# Patient Record
Sex: MALE | Race: WHITE | NOT HISPANIC OR LATINO | Employment: OTHER | URBAN - METROPOLITAN AREA
[De-identification: names, ages, dates, MRNs, and addresses within clinical notes are randomized per-mention and may not be internally consistent; named-entity substitution may affect disease eponyms.]

---

## 2017-03-07 ENCOUNTER — OFFICE VISIT (OUTPATIENT)
Dept: DERMATOLOGY | Facility: CLINIC | Age: 67
End: 2017-03-07
Payer: MEDICARE

## 2017-03-07 VITALS — WEIGHT: 204.81 LBS | HEIGHT: 72 IN | BODY MASS INDEX: 27.74 KG/M2

## 2017-03-07 DIAGNOSIS — D48.5 NEOPLASM OF UNCERTAIN BEHAVIOR OF SKIN: Primary | ICD-10-CM

## 2017-03-07 DIAGNOSIS — L57.0 MULTIPLE ACTINIC KERATOSES: ICD-10-CM

## 2017-03-07 DIAGNOSIS — Z85.828 PERSONAL HISTORY OF OTHER MALIGNANT NEOPLASM OF SKIN: ICD-10-CM

## 2017-03-07 PROCEDURE — 99999 PR PBB SHADOW E&M-EST. PATIENT-LVL II: CPT | Mod: PBBFAC,,, | Performed by: DERMATOLOGY

## 2017-03-07 PROCEDURE — 99212 OFFICE O/P EST SF 10 MIN: CPT | Mod: PBBFAC,PO | Performed by: DERMATOLOGY

## 2017-03-07 PROCEDURE — 99213 OFFICE O/P EST LOW 20 MIN: CPT | Mod: 25,S$PBB,, | Performed by: DERMATOLOGY

## 2017-03-07 PROCEDURE — 11100 PR BIOPSY OF SKIN LESION: CPT | Mod: S$PBB,,, | Performed by: DERMATOLOGY

## 2017-03-07 PROCEDURE — 88305 TISSUE EXAM BY PATHOLOGIST: CPT | Performed by: PATHOLOGY

## 2017-03-07 PROCEDURE — 11100 PR BIOPSY OF SKIN LESION: CPT | Mod: PBBFAC,PO | Performed by: DERMATOLOGY

## 2017-03-07 RX ORDER — FLUOROURACIL 50 MG/G
CREAM TOPICAL
Qty: 40 G | Refills: 1 | Status: SHIPPED | OUTPATIENT
Start: 2017-03-07 | End: 2020-06-03 | Stop reason: SDUPTHER

## 2017-03-07 NOTE — PROGRESS NOTES
Subjective:       Patient ID:  Rob Hickman is a 67 y.o. male who presents for   Chief Complaint   Patient presents with    Follow-up     HPI Comments:   High risk patient here for skin/ mole check     C/o a  new raised flesh colored spot on left side of face for 1 year with no itching and no treatment.        Phc BCC L arm approx 2010- Dr Gomez excised   Pt admitted to high sun exposure & sunburns in youth   Father had skin ca- type unknown           history numerous aks s/p cryo at last visit 7/2016  Growth on right chest, years, asx, no tx.     Review of Systems   Skin: Positive for activity-related sunscreen use and wears hat.        Objective:    Physical Exam   Constitutional: He appears well-developed and well-nourished. No distress.   HENT:   Head:       Mouth/Throat: Lips normal.    Eyes: Lids are normal.  No conjunctival no injection.   Neurological: He is alert and oriented to person, place, and time. He is not disoriented.   Psychiatric: He has a normal mood and affect.   Skin:   Areas Examined (abnormalities noted in diagram):   Head / Face Inspection Performed  Neck Inspection Performed  Chest / Axilla Inspection Performed  Back Inspection Performed  RUE Inspected  LUE Inspection Performed                   Diagram Legend     Erythematous scaling macule/papule c/w actinic keratosis       Vascular papule c/w angioma      Pigmented verrucoid papule/plaque c/w seborrheic keratosis      Yellow umbilicated papule c/w sebaceous hyperplasia      Irregularly shaped tan macule c/w lentigo     1-2 mm smooth white papules consistent with Milia      Movable subcutaneous cyst with punctum c/w epidermal inclusion cyst      Subcutaneous movable cyst c/w pilar cyst      Firm pink to brown papule c/w dermatofibroma      Pedunculated fleshy papule(s) c/w skin tag(s)      Evenly pigmented macule c/w junctional nevus     Mildly variegated pigmented, slightly irregular-bordered macule c/w mildly atypical nevus       Flesh colored to evenly pigmented papule c/w intradermal nevus       Pink pearly papule/plaque c/w basal cell carcinoma      Erythematous hyperkeratotic cursted plaque c/w SCC      Surgical scar with no sign of skin cancer recurrence      Open and closed comedones      Inflammatory papules and pustules      Verrucoid papule consistent consistent with wart     Erythematous eczematous patches and plaques     Dystrophic onycholytic nail with subungual debris c/w onychomycosis     Umbilicated papule    Erythematous-base heme-crusted tan verrucoid plaque consistent with inflamed seborrheic keratosis     Erythematous Silvery Scaling Plaque c/w Psoriasis     See annotation      Assessment / Plan:      Pathology Orders:      Normal Orders This Visit    Tissue Specimen To Pathology, Dermatology     Questions:    Directional Terms:  Other(comment)    Clinical information:  SK vs other    Specific Site:  left forehead        Neoplasm of uncertain behavior of skin  -     Tissue Specimen To Pathology, Dermatology    Shave biopsy procedure note:    Shave biopsy performed after verbal consent including risk of infection, scar, recurrence, need for additional treatment of site. Area prepped with alcohol, anesthetized with approximately 1.0cc of 1% lidocaine with epinephrine. Lesional tissue shaved with razor blade. Hemostasis achieved with application of aluminum chloride followed by hyfrecation. No complications. Dressing applied. Wound care explained.        Multiple actinic keratoses  Declines cryo today  Diffuse involvement. Ideal candidate efudex . Treat forehead, once healed ok to treat one cheek at a time.   -     fluorouracil (EFUDEX) 5 % cream; AAA forehead bid x 2 weeks, stop if severe irritation develops  Dispense: 40 g; Refill: 1    Personal history of other malignant neoplasm of skin  Area(s) of previous NMSC evaluated with no signs of recurrence.    Upper body skin examination performed today including at least 6  points as noted in physical examination. Suspicious lesions noted.    Seborrheic keratosis, right chest  These are benign inherited growths without a malignant potential. Reassurance given to patient. No treatment is necessary.            Return in about 3 months (around 6/7/2017).

## 2017-05-24 ENCOUNTER — OFFICE VISIT (OUTPATIENT)
Dept: DERMATOLOGY | Facility: CLINIC | Age: 67
End: 2017-05-24
Payer: MEDICARE

## 2017-05-24 VITALS — HEIGHT: 72 IN | WEIGHT: 204 LBS | BODY MASS INDEX: 27.63 KG/M2 | RESPIRATION RATE: 16 BRPM

## 2017-05-24 DIAGNOSIS — L82.1 SEBORRHEIC KERATOSIS: ICD-10-CM

## 2017-05-24 DIAGNOSIS — L57.0 ACTINIC KERATOSES: ICD-10-CM

## 2017-05-24 DIAGNOSIS — Z12.83 SKIN CANCER SCREENING: ICD-10-CM

## 2017-05-24 DIAGNOSIS — Z87.2 HISTORY OF ACTINIC KERATOSES: Primary | ICD-10-CM

## 2017-05-24 DIAGNOSIS — Z85.828 PERSONAL HISTORY OF OTHER MALIGNANT NEOPLASM OF SKIN: ICD-10-CM

## 2017-05-24 PROCEDURE — 99214 OFFICE O/P EST MOD 30 MIN: CPT | Mod: S$PBB,,, | Performed by: DERMATOLOGY

## 2017-05-24 PROCEDURE — 99999 PR PBB SHADOW E&M-EST. PATIENT-LVL II: CPT | Mod: PBBFAC,,, | Performed by: DERMATOLOGY

## 2017-05-24 PROCEDURE — 99212 OFFICE O/P EST SF 10 MIN: CPT | Mod: PBBFAC,PO | Performed by: DERMATOLOGY

## 2017-05-24 NOTE — PROGRESS NOTES
Subjective:       Patient ID:  Rob Hickman is a 67 y.o. male who presents for   Chief Complaint   Patient presents with    Follow-up     Last seen 3-7-17   Multiple actinic keratoses - declined cryo    Treated forehead w/  fluorouracil (EFUDEX) 5 % cream; AAA forehead bid x 2 weeks - Pt stated has improved. Has not tx cheeks yet. Moderate irritation with efudex use, resolved     Scaling posterior scalp, irritating, months, no tx.    Growth on left ear, years, asx, no tx        FINAL PATHOLOGIC DATED 3-7-17   1. Skin, left forehead, shave biopsy:  - INFLAMED SEBORRHEIC KERATOSIS.  MICROSCOPIC DESCRIPTION: Sections show an abrupt proliferation of benign basaloid keratinocytes associated  with horn pseudocyst formation. A lymphocytic infiltrate is noted.  Diagnosed by: Eric Vogel M.D.  (Electronically Signed: 2017-03-09 15:24:26)      Phx BCC L arm approx 2010- Dr Gomez excised   Pt admitted to high sun exposure & sunburns in youth   Father had skin ca- type unknown      Phx  numerous AK's  s/p cryo             Review of Systems   Skin: Positive for activity-related sunscreen use and wears hat.   Hematologic/Lymphatic: Bruises/bleeds easily.        Objective:    Physical Exam   Constitutional: He appears well-developed and well-nourished. No distress.   HENT:   Mouth/Throat: Lips normal.    Eyes: Lids are normal.  No conjunctival no injection.   Neurological: He is alert and oriented to person, place, and time. He is not disoriented.   Psychiatric: He has a normal mood and affect.   Skin:   Areas Examined (abnormalities noted in diagram):   Scalp / Hair Palpated and Inspected  Head / Face Inspection Performed  Neck Inspection Performed  RUE Inspected  LUE Inspection Performed  Nails and Digits Inspection Performed                   Diagram Legend     Erythematous scaling macule/papule c/w actinic keratosis       Vascular papule c/w angioma      Pigmented verrucoid papule/plaque c/w seborrheic keratosis       Yellow umbilicated papule c/w sebaceous hyperplasia      Irregularly shaped tan macule c/w lentigo     1-2 mm smooth white papules consistent with Milia      Movable subcutaneous cyst with punctum c/w epidermal inclusion cyst      Subcutaneous movable cyst c/w pilar cyst      Firm pink to brown papule c/w dermatofibroma      Pedunculated fleshy papule(s) c/w skin tag(s)      Evenly pigmented macule c/w junctional nevus     Mildly variegated pigmented, slightly irregular-bordered macule c/w mildly atypical nevus      Flesh colored to evenly pigmented papule c/w intradermal nevus       Pink pearly papule/plaque c/w basal cell carcinoma      Erythematous hyperkeratotic cursted plaque c/w SCC      Surgical scar with no sign of skin cancer recurrence      Open and closed comedones      Inflammatory papules and pustules      Verrucoid papule consistent consistent with wart     Erythematous eczematous patches and plaques     Dystrophic onycholytic nail with subungual debris c/w onychomycosis     Umbilicated papule    Erythematous-base heme-crusted tan verrucoid plaque consistent with inflamed seborrheic keratosis     Erythematous Silvery Scaling Plaque c/w Psoriasis     See annotation      Assessment / Plan:        History of actinic keratoses  Improved s/p cryo to hands and efudex to forehead, no residual lesions in these locations today      Actinic keratoses, cheeks/scalp/left helix  Declines cryo today  Discussed importance of tx and progression to skin cancer if left untreated  Discussed alternative tx would be efudex. Recommend tx one area at a time with efudex bid x 1-2 weeks. Stop if severe irritation develops  Recommend tx scalp, cheeks and left helix  Ok to tx arms and dorsal hands in future as well, giving priority to locations listed above    Skin cancer screening  Area(s) of previous NMSC evaluated with no signs of recurrence.  No lesions suspicious for malignancy noted.      Personal history of other  malignant neoplasm of skin  Area(s) of previous NMSC evaluated with no signs of recurrence.  No lesions suspicious for malignancy noted.    Seborrheic keratosis, left helix  These are benign inherited growths without a malignant potential. Reassurance given to patient. No treatment is necessary.                Return in about 6 months (around 11/24/2017).

## 2017-09-14 ENCOUNTER — OFFICE VISIT (OUTPATIENT)
Dept: FAMILY MEDICINE | Facility: CLINIC | Age: 67
End: 2017-09-14
Payer: MEDICARE

## 2017-09-14 VITALS
BODY MASS INDEX: 27.25 KG/M2 | SYSTOLIC BLOOD PRESSURE: 120 MMHG | HEART RATE: 68 BPM | DIASTOLIC BLOOD PRESSURE: 74 MMHG | WEIGHT: 201.19 LBS | TEMPERATURE: 99 F | HEIGHT: 72 IN | RESPIRATION RATE: 14 BRPM

## 2017-09-14 DIAGNOSIS — H69.90 ETD (EUSTACHIAN TUBE DYSFUNCTION), UNSPECIFIED LATERALITY: ICD-10-CM

## 2017-09-14 DIAGNOSIS — J32.9 SINUSITIS, UNSPECIFIED CHRONICITY, UNSPECIFIED LOCATION: Primary | ICD-10-CM

## 2017-09-14 PROCEDURE — 99214 OFFICE O/P EST MOD 30 MIN: CPT | Mod: S$GLB,,, | Performed by: NURSE PRACTITIONER

## 2017-09-14 PROCEDURE — 1159F MED LIST DOCD IN RCRD: CPT | Mod: S$GLB,,, | Performed by: NURSE PRACTITIONER

## 2017-09-14 PROCEDURE — 1126F AMNT PAIN NOTED NONE PRSNT: CPT | Mod: S$GLB,,, | Performed by: NURSE PRACTITIONER

## 2017-09-14 RX ORDER — AMOXICILLIN 875 MG/1
875 TABLET, FILM COATED ORAL 2 TIMES DAILY
Qty: 14 TABLET | Refills: 0 | Status: SHIPPED | OUTPATIENT
Start: 2017-09-14 | End: 2017-09-21

## 2017-09-14 NOTE — PROGRESS NOTES
"Subjective:       Patient ID: Rob Hickman is a 67 y.o. male.    Chief Complaint: ears feel full, nasal congestion, upper chest congestion wit    URI    This is a new problem. Episode onset: one week. The problem has been gradually worsening. Associated symptoms include congestion, coughing, ear pain, headaches, a plugged ear sensation, rhinorrhea, sinus pain, sneezing and a sore throat. Pertinent negatives include no abdominal pain, chest pain, diarrhea, dysuria, joint pain, joint swelling, nausea, neck pain, rash, swollen glands, vomiting or wheezing. He has tried acetaminophen, antihistamine and increased fluids for the symptoms.     Review of Systems   Constitutional: Positive for appetite change and fatigue.   HENT: Positive for congestion, ear pain, postnasal drip, rhinorrhea, sinus pain, sinus pressure, sneezing and sore throat.    Eyes: Negative for pain and redness.   Respiratory: Positive for cough. Negative for choking, chest tightness, shortness of breath and wheezing.    Cardiovascular: Negative for chest pain and palpitations.   Gastrointestinal: Negative for abdominal distention, abdominal pain, constipation, diarrhea, nausea and vomiting.   Endocrine: Negative for polydipsia and polyphagia.   Genitourinary: Negative for dysuria and hematuria.   Musculoskeletal: Negative for arthralgias, joint pain, joint swelling, myalgias and neck pain.   Skin: Negative for color change, pallor and rash.   Neurological: Positive for headaches. Negative for dizziness and light-headedness.       Objective:       Vitals:    09/14/17 1536   BP: 120/74   Pulse: 68   Resp: 14   Temp: 98.9 °F (37.2 °C)   TempSrc: Oral   Weight: 91.3 kg (201 lb 2.7 oz)   Height: 5' 11.5" (1.816 m)   PainSc: 0-No pain       Physical Exam   Constitutional: He is oriented to person, place, and time. He appears well-developed and well-nourished.   HENT:   Head: Normocephalic and atraumatic.   Right Ear: Hearing, tympanic membrane, external " ear and ear canal normal.   Left Ear: Hearing, tympanic membrane, external ear and ear canal normal.   Nose: Mucosal edema and rhinorrhea present. No nose lacerations or sinus tenderness. Right sinus exhibits maxillary sinus tenderness and frontal sinus tenderness. Left sinus exhibits maxillary sinus tenderness and frontal sinus tenderness.   Mouth/Throat: Uvula is midline and mucous membranes are normal. Posterior oropharyngeal edema and posterior oropharyngeal erythema present.   Eyes: Conjunctivae are normal. Pupils are equal, round, and reactive to light. Right eye exhibits no discharge. Left eye exhibits no discharge. No scleral icterus.   Neck: Normal range of motion. Neck supple. No tracheal deviation present.   Cardiovascular: Normal rate and regular rhythm.  Exam reveals no gallop and no friction rub.    No murmur heard.  Pulmonary/Chest: Effort normal and breath sounds normal. No stridor. No respiratory distress. He has no wheezes. He has no rales. He exhibits no tenderness.   Musculoskeletal: Normal range of motion.   Lymphadenopathy:     He has cervical adenopathy.   Neurological: He is alert and oriented to person, place, and time.   Skin: Skin is warm and dry.   Psychiatric: He has a normal mood and affect. His behavior is normal. Judgment and thought content normal.       Assessment:       1. Sinusitis, unspecified chronicity, unspecified location    2. ETD (eustachian tube dysfunction), unspecified laterality        Plan:         Rob was seen today for ears feel full, nasal congestion, upper chest congestion wit.    Diagnoses and all orders for this visit:    Sinusitis, unspecified chronicity, unspecified location  -     amoxicillin (AMOXIL) 875 MG tablet; Take 1 tablet (875 mg total) by mouth 2 (two) times daily.    ETD (eustachian tube dysfunction), unspecified laterality  Sudafed OTC as directed

## 2017-10-11 ENCOUNTER — TELEPHONE (OUTPATIENT)
Dept: DERMATOLOGY | Facility: CLINIC | Age: 67
End: 2017-10-11

## 2017-10-11 NOTE — TELEPHONE ENCOUNTER
----- Message from Tiera Vasquez sent at 10/11/2017  9:12 AM CDT -----  Contact: pt  Pt would like to speak to the nurse regarding the removal of a mole,like an appointment as soon as possible ,please...[phone]

## 2017-10-12 ENCOUNTER — OFFICE VISIT (OUTPATIENT)
Dept: DERMATOLOGY | Facility: CLINIC | Age: 67
End: 2017-10-12
Payer: MEDICARE

## 2017-10-12 VITALS — WEIGHT: 201 LBS | BODY MASS INDEX: 28.14 KG/M2 | HEIGHT: 71 IN

## 2017-10-12 DIAGNOSIS — L82.0 INFLAMED SEBORRHEIC KERATOSIS: Primary | ICD-10-CM

## 2017-10-12 DIAGNOSIS — Z85.828 PERSONAL HISTORY OF MALIGNANT NEOPLASM OF SKIN: ICD-10-CM

## 2017-10-12 DIAGNOSIS — L57.0 ACTINIC KERATOSES: ICD-10-CM

## 2017-10-12 DIAGNOSIS — Z12.83 SKIN CANCER SCREENING: ICD-10-CM

## 2017-10-12 PROCEDURE — 99999 PR PBB SHADOW E&M-EST. PATIENT-LVL II: CPT | Mod: PBBFAC,,, | Performed by: DERMATOLOGY

## 2017-10-12 PROCEDURE — 17110 DESTRUCTION B9 LES UP TO 14: CPT | Mod: PBBFAC,PO | Performed by: DERMATOLOGY

## 2017-10-12 PROCEDURE — 17110 DESTRUCTION B9 LES UP TO 14: CPT | Mod: S$PBB,,, | Performed by: DERMATOLOGY

## 2017-10-12 PROCEDURE — 99214 OFFICE O/P EST MOD 30 MIN: CPT | Mod: 25,S$PBB,, | Performed by: DERMATOLOGY

## 2017-10-12 PROCEDURE — 99212 OFFICE O/P EST SF 10 MIN: CPT | Mod: PBBFAC,PO | Performed by: DERMATOLOGY

## 2017-10-12 NOTE — PROGRESS NOTES
Subjective:       Patient ID:  Rob Hickman is a 67 y.o. male who presents for   Chief Complaint   Patient presents with    Skin Check    Lesion     Pt last seen on 5-  Complains of growth on left ear, years,scaly- not treated- desires removal today  Hx of Multiple actinic keratoses -  treated forehead w/  fluorouracil (EFUDEX) 5 % cream; AAA forehead bid x 2 weeks - Pt stated has improved. Moderate irritation with efudex use, resolved. He plans of treating his cheeks once the weather is cooler      FINAL PATHOLOGIC DATED 3-7-17   1. Skin, left forehead, shave biopsy:  - INFLAMED SEBORRHEIC KERATOSIS.  MICROSCOPIC DESCRIPTION: Sections show an abrupt proliferation of benign basaloid keratinocytes associated  with horn pseudocyst formation. A lymphocytic infiltrate is noted.  Diagnosed by: Eric Vogel M.D.  (Electronically Signed: 2017-03-09 15:24:26)        Phx BCC L arm approx 2010- Dr Gomez excised   Pt admitted to high sun exposure & sunburns in youth   Father had skin ca- type unknown  Phx  numerous AK's  s/p cryo                 Review of Systems   Skin: Positive for activity-related sunscreen use. Negative for daily sunscreen use and recent sunburn.        Objective:    Physical Exam   Constitutional: He appears well-developed and well-nourished. No distress.   HENT:   Mouth/Throat: Lips normal.    Eyes: Lids are normal.  No conjunctival no injection.   Neurological: He is alert and oriented to person, place, and time. He is not disoriented.   Psychiatric: He has a normal mood and affect.   Skin:   Areas Examined (abnormalities noted in diagram):   Scalp / Hair Palpated and Inspected  Head / Face Inspection Performed  Neck Inspection Performed  Chest / Axilla Inspection Performed  Abdomen Inspection Performed  Back Inspection Performed  RUE Inspected  LUE Inspection Performed                   Diagram Legend     Erythematous scaling macule/papule c/w actinic keratosis       Vascular papule  c/w angioma      Pigmented verrucoid papule/plaque c/w seborrheic keratosis      Yellow umbilicated papule c/w sebaceous hyperplasia      Irregularly shaped tan macule c/w lentigo     1-2 mm smooth white papules consistent with Milia      Movable subcutaneous cyst with punctum c/w epidermal inclusion cyst      Subcutaneous movable cyst c/w pilar cyst      Firm pink to brown papule c/w dermatofibroma      Pedunculated fleshy papule(s) c/w skin tag(s)      Evenly pigmented macule c/w junctional nevus     Mildly variegated pigmented, slightly irregular-bordered macule c/w mildly atypical nevus      Flesh colored to evenly pigmented papule c/w intradermal nevus       Pink pearly papule/plaque c/w basal cell carcinoma      Erythematous hyperkeratotic cursted plaque c/w SCC      Surgical scar with no sign of skin cancer recurrence      Open and closed comedones      Inflammatory papules and pustules      Verrucoid papule consistent consistent with wart     Erythematous eczematous patches and plaques     Dystrophic onycholytic nail with subungual debris c/w onychomycosis     Umbilicated papule    Erythematous-base heme-crusted tan verrucoid plaque consistent with inflamed seborrheic keratosis     Erythematous Silvery Scaling Plaque c/w Psoriasis     See annotation      Assessment / Plan:        Inflamed seborrheic keratosis, left ear  Cryosurgery procedure note:    Verbal consent from the patient is obtained. Liquid nitrogen cryosurgery is applied to 1 lesions to produce a freeze injury. The patient is aware that blisters may form and is instructed on wound care with gentle cleansing and use of vaseline ointment to keep moist until healed. The patient is supplied a handout on cryosurgery and is instructed to call if lesions do not completely resolve. Risk of dyspigmentation discussed.       Actinic keratoses, scalp, cheeks, right ear  Declines cryo today  Recommend treat areas with efudex bid- one area at a atime.      Personal history of malignant neoplasm of skin  Area(s) of previous NMSC evaluated with no signs of recurrence.    Upper body skin examination performed today including at least 6 points as noted in physical examination. No lesions suspicious for malignancy noted.      Skin cancer screening  Area(s) of previous NMSC evaluated with no signs of recurrence.    Upper body skin examination performed today including at least 6 points as noted in physical examination. No lesions suspicious for malignancy noted.               Return in about 6 months (around 4/12/2018).

## 2018-05-11 ENCOUNTER — OFFICE VISIT (OUTPATIENT)
Dept: OPTOMETRY | Facility: CLINIC | Age: 68
End: 2018-05-11
Payer: MEDICARE

## 2018-05-11 DIAGNOSIS — H21.302: ICD-10-CM

## 2018-05-11 DIAGNOSIS — H43.812 POSTERIOR VITREOUS DETACHMENT, LEFT EYE: Primary | ICD-10-CM

## 2018-05-11 PROCEDURE — 92020 GONIOSCOPY: CPT | Mod: S$PBB,,, | Performed by: OPTOMETRIST

## 2018-05-11 PROCEDURE — 92004 COMPRE OPH EXAM NEW PT 1/>: CPT | Mod: S$PBB,,, | Performed by: OPTOMETRIST

## 2018-05-11 PROCEDURE — 92020 GONIOSCOPY: CPT | Mod: PBBFAC,PO | Performed by: OPTOMETRIST

## 2018-05-11 PROCEDURE — 99213 OFFICE O/P EST LOW 20 MIN: CPT | Mod: PBBFAC,PO | Performed by: OPTOMETRIST

## 2018-05-11 PROCEDURE — 99999 PR PBB SHADOW E&M-EST. PATIENT-LVL III: CPT | Mod: PBBFAC,,, | Performed by: OPTOMETRIST

## 2018-05-11 NOTE — PROGRESS NOTES
HPI     Spots and/or Floaters    Additional comments: increase in floaters & light flashes OS x 2 days  //   no eye pain           Blurred Vision    Additional comments: decrease in vision OS x 1 week --- cloudiness moves   over OS           Comments   2-3 weeks of blur and floaters  Notes since Wednesday night had 5-6 episodes of flash in OS temporal         Last edited by KENJI Ackerman, OD on 5/11/2018  3:08 PM. (History)            Assessment /Plan     For exam results, see Encounter Report.    Posterior vitreous detachment, left eye    Cyst of iris, ciliary body and anterior chamber, left  -     Ambulatory Referral to Ophthalmology      1. No evidence of RD or tear  Not acute ~ 2-3 weeks duration  Gave RD precautions and reviewed  Knows to call if any further changes f/f    2. Moderate / large presumed iris cyst OS--noted on dilation  Pt denies previous mention  Narrow angle overlying cyst  Consult w/ Dr Gómez to r/o lesion     RTC pending results

## 2018-06-04 ENCOUNTER — TELEPHONE (OUTPATIENT)
Dept: ENDOCRINOLOGY | Facility: CLINIC | Age: 68
End: 2018-06-04

## 2018-06-04 DIAGNOSIS — E05.90 HYPERTHYROIDISM: Primary | ICD-10-CM

## 2018-06-04 NOTE — TELEPHONE ENCOUNTER
Pt booked for New Pt in July appt (last seen March 2013)  Reports feeling his thyroid needs to be checked. Wondering if he could have any labs done prior to visit  Please advise

## 2018-06-04 NOTE — TELEPHONE ENCOUNTER
----- Message from Baltazar Villegas sent at 6/4/2018  9:40 AM CDT -----  Contact: self   Patient need orders to schedule blood work, please call back at [phone]

## 2018-06-04 NOTE — TELEPHONE ENCOUNTER
----- Message from Baltazar Villegas sent at 6/4/2018  9:40 AM CDT -----  Contact: self   Patient need orders to schedule blood work, please call back at [phone]

## 2018-06-13 ENCOUNTER — INITIAL CONSULT (OUTPATIENT)
Dept: OPHTHALMOLOGY | Facility: CLINIC | Age: 68
End: 2018-06-13
Payer: MEDICARE

## 2018-06-13 DIAGNOSIS — H21.302: Primary | ICD-10-CM

## 2018-06-13 PROCEDURE — 92014 COMPRE OPH EXAM EST PT 1/>: CPT | Mod: S$PBB,,, | Performed by: OPHTHALMOLOGY

## 2018-06-13 PROCEDURE — 99212 OFFICE O/P EST SF 10 MIN: CPT | Mod: PBBFAC | Performed by: OPHTHALMOLOGY

## 2018-06-13 PROCEDURE — 99999 PR PBB SHADOW E&M-EST. PATIENT-LVL II: CPT | Mod: PBBFAC,,, | Performed by: OPHTHALMOLOGY

## 2018-06-13 NOTE — PROGRESS NOTES
HPI     Iris Cyst    Additional comments: left eye           Comments   Patient states he had vit detachment and during that examination is when   the Iris Cyst was found. Denies pain.     AT's PRN OU       Last edited by Mylene Ahmadi on 6/13/2018  1:53 PM. (History)            Assessment /Plan     For exam results, see Encounter Report.    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

## 2018-06-13 NOTE — LETTER
June 13, 2018      KENJI Ackerman, OD  [address] [facility] [address] [place] [address]           [facility] - Ophthalmology  [address]  [place] [address]  Phone: [phone]  Fax: [phone]          Patient: Rob Hickman   MR Number: [id number]   YOB: 1950   Date of Visit: 6/13/2018       Dear Dr. KENJI Ackerman:    Thank you for referring Rob Hickman to me for evaluation. Attached you will find relevant portions of my assessment and plan of care.    If you have questions, please do not hesitate to call me. I look forward to following Rob Hickman along with you.    Sincerely,    Nayan Obrien MD    Enclosure  CC:  No Recipients    If you would like to receive this communication electronically, please contact [email or web address] or [phone] to request more information on [employer] Link access.    For providers and/or their staff who would like to refer a patient to [facility], please contact us through our one-stop-shop provider referral line, [facility] , at [phone].    If you feel you have received this communication in error or would no longer like to receive these types of communications, please e-mail [email or web address]

## 2018-07-05 ENCOUNTER — LAB VISIT (OUTPATIENT)
Dept: LAB | Facility: HOSPITAL | Age: 68
End: 2018-07-05
Attending: INTERNAL MEDICINE
Payer: MEDICARE

## 2018-07-05 DIAGNOSIS — E05.90 HYPERTHYROIDISM: ICD-10-CM

## 2018-07-05 LAB — TSH SERPL DL<=0.005 MIU/L-ACNC: 1.4 UIU/ML

## 2018-07-05 PROCEDURE — 84443 ASSAY THYROID STIM HORMONE: CPT

## 2018-07-05 PROCEDURE — 36415 COLL VENOUS BLD VENIPUNCTURE: CPT | Mod: PO

## 2018-07-12 ENCOUNTER — OFFICE VISIT (OUTPATIENT)
Dept: ENDOCRINOLOGY | Facility: CLINIC | Age: 68
End: 2018-07-12
Payer: MEDICARE

## 2018-07-12 VITALS
HEART RATE: 57 BPM | SYSTOLIC BLOOD PRESSURE: 106 MMHG | BODY MASS INDEX: 28.37 KG/M2 | HEIGHT: 71 IN | WEIGHT: 202.63 LBS | DIASTOLIC BLOOD PRESSURE: 60 MMHG

## 2018-07-12 DIAGNOSIS — E05.00 GRAVES DISEASE: Primary | ICD-10-CM

## 2018-07-12 PROCEDURE — 99213 OFFICE O/P EST LOW 20 MIN: CPT | Mod: PBBFAC,PO | Performed by: INTERNAL MEDICINE

## 2018-07-12 PROCEDURE — 99999 PR PBB SHADOW E&M-EST. PATIENT-LVL III: CPT | Mod: PBBFAC,,, | Performed by: INTERNAL MEDICINE

## 2018-07-12 PROCEDURE — 99203 OFFICE O/P NEW LOW 30 MIN: CPT | Mod: S$PBB,,, | Performed by: INTERNAL MEDICINE

## 2018-07-12 NOTE — PROGRESS NOTES
CHIEF COMPLAINT: Hyperthyroidism  68 year old male here for NP visit. Seen > 3 years ago. Had been on tapazole in the past. Had some muscle twitching that he had when 1st diagnosed and concerned that his hyperthyroidism returned. No palpitations. No tremors. No Anxiety.           PAST MEDICAL HISTORY: Diverticulitis.    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 Works at a refinery    FAMILY HISTORY: Mother had hyperthyroidism    MEDICATIONS/ALLERGIES: The patient's MedCard has been updated and reviewed.     ROS:   Constitutional:weight stable.   Eyes: No recent visual changes  ENT: No dysphagia  Cardiovascular: Denies current anginal symptoms  Respiratory: No SOB  Gastrointestinal:No N/V  GenitoUrinary - No dysuria  Skin: No new skin rash  Neurologic: No focal neurologic complaints  Remainder ROS negative    PE:  GENERAL: Well developed, well nourished  EYES: Anicteric, symmetrical pupils  NECK:  No palpable nodules  LYMPHATIC: No cervical or supraclavicular lymphadenopathy  CARDIOVASCULAR: Normal heart sounds, no pedal edema  RESPIRATORY: Normal effort, clear to auscultation  SKIN: No significant new abnormality or inspection or palpitation  NEUROLOGIC: No facial asymmetry. Symmetric DTR  PSYCHIATRIC: Oriented and alert, appropriate mood and affect        Results for TAMMY BERNSTEIN (MRN [id number]) as of 7/12/2018 08:08   Ref. Range 7/5/2018 09:46   TSH Latest Ref Range: 0.400 - 4.000 uIU/mL 1.396           ASSESSMENT/PLAN:  1. Grave's Disease- + Ab and high uptake on scan. Remission after Tx with tapazole.  TSH WNL. Discussed symptoms to monitor for. Can get TSH with yearly physical with PCP    FOLLOWUP  AVS

## 2019-03-27 ENCOUNTER — OFFICE VISIT (OUTPATIENT)
Dept: OPHTHALMOLOGY | Facility: CLINIC | Age: 69
End: 2019-03-27
Payer: MEDICARE

## 2019-03-27 DIAGNOSIS — H21.302: Primary | ICD-10-CM

## 2019-03-27 PROCEDURE — 76513 B-SCAN ULTRASOUND, HIGH RESOLUTION - OS - LEFT EYE: ICD-10-PCS | Mod: 26,LT,, | Performed by: OPHTHALMOLOGY

## 2019-03-27 PROCEDURE — 92014 PR EYE EXAM, EST PATIENT,COMPREHESV: ICD-10-PCS | Mod: S$PBB,,, | Performed by: OPHTHALMOLOGY

## 2019-03-27 PROCEDURE — 76513 OPH US DX ANT SGM US UNI/BI: CPT | Mod: 26,LT,, | Performed by: OPHTHALMOLOGY

## 2019-03-27 PROCEDURE — 99999 PR PBB SHADOW E&M-EST. PATIENT-LVL II: CPT | Mod: PBBFAC,,, | Performed by: OPHTHALMOLOGY

## 2019-03-27 PROCEDURE — 92014 COMPRE OPH EXAM EST PT 1/>: CPT | Mod: S$PBB,,, | Performed by: OPHTHALMOLOGY

## 2019-03-27 PROCEDURE — 99212 OFFICE O/P EST SF 10 MIN: CPT | Mod: PBBFAC | Performed by: OPHTHALMOLOGY

## 2019-03-27 PROCEDURE — 99999 PR PBB SHADOW E&M-EST. PATIENT-LVL II: ICD-10-PCS | Mod: PBBFAC,,, | Performed by: OPHTHALMOLOGY

## 2019-03-27 NOTE — PROGRESS NOTES
HPI     68 YO here for follow up for iris cyst. Pt denies pain, flashes,   floaters. Pt states he had vit detachment and discovered iris cyst at   initial exam on 6/13/2018.     No eye gtts.  No eye sx.     Last edited by Kathleen Egan on 3/27/2019  8:33 AM. (History)            Assessment /Plan     For exam results, see Encounter Report.    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

## 2020-05-14 ENCOUNTER — TELEPHONE (OUTPATIENT)
Dept: DERMATOLOGY | Facility: CLINIC | Age: 70
End: 2020-05-14

## 2020-05-14 NOTE — TELEPHONE ENCOUNTER
Attempted to contact pt to reschedule cancelled appt with Dr. Hawkins. No answer. LVM to contact office to reschedule.

## 2020-05-28 ENCOUNTER — PATIENT MESSAGE (OUTPATIENT)
Dept: DERMATOLOGY | Facility: CLINIC | Age: 70
End: 2020-05-28

## 2020-06-03 ENCOUNTER — OFFICE VISIT (OUTPATIENT)
Dept: DERMATOLOGY | Facility: CLINIC | Age: 70
End: 2020-06-03
Payer: MEDICARE

## 2020-06-03 VITALS — HEIGHT: 71 IN | BODY MASS INDEX: 28.37 KG/M2 | WEIGHT: 202.63 LBS

## 2020-06-03 DIAGNOSIS — L57.0 MULTIPLE ACTINIC KERATOSES: ICD-10-CM

## 2020-06-03 DIAGNOSIS — L82.1 SEBORRHEIC KERATOSES: ICD-10-CM

## 2020-06-03 DIAGNOSIS — Z12.83 SKIN CANCER SCREENING: ICD-10-CM

## 2020-06-03 DIAGNOSIS — Z85.828 PERSONAL HISTORY OF OTHER MALIGNANT NEOPLASM OF SKIN: ICD-10-CM
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
## 2020-06-03 DIAGNOSIS — L60.0 INGROWN TOENAIL OF LEFT FOOT: ICD-10-CM

## 2020-06-03 PROCEDURE — 17000 PR DESTRUCTION(LASER SURGERY,CRYOSURGERY,CHEMOSURGERY),PREMALIGNANT LESIONS,FIRST LESION: ICD-10-PCS | Mod: S$PBB,,, | Performed by: DERMATOLOGY

## 2020-06-03 PROCEDURE — 17003 DESTRUCTION, PREMALIGNANT LESIONS; SECOND THROUGH 14 LESIONS: ICD-10-PCS | Mod: S$PBB,,, | Performed by: DERMATOLOGY

## 2020-06-03 PROCEDURE — 99999 PR PBB SHADOW E&M-EST. PATIENT-LVL III: CPT | Mod: PBBFAC,,, | Performed by: DERMATOLOGY

## 2020-06-03 PROCEDURE — 99214 OFFICE O/P EST MOD 30 MIN: CPT | Mod: 25,S$PBB,, | Performed by: DERMATOLOGY

## 2020-06-03 PROCEDURE — 17000 DESTRUCT PREMALG LESION: CPT | Mod: PBBFAC,PO | Performed by: DERMATOLOGY

## 2020-06-03 PROCEDURE — 17003 DESTRUCT PREMALG LES 2-14: CPT | Mod: PBBFAC,PO | Performed by: DERMATOLOGY

## 2020-06-03 PROCEDURE — 17003 DESTRUCT PREMALG LES 2-14: CPT | Mod: S$PBB,,, | Performed by: DERMATOLOGY

## 2020-06-03 PROCEDURE — 99213 OFFICE O/P EST LOW 20 MIN: CPT | Mod: PBBFAC,PO | Performed by: DERMATOLOGY

## 2020-06-03 PROCEDURE — 99999 PR PBB SHADOW E&M-EST. PATIENT-LVL III: ICD-10-PCS | Mod: PBBFAC,,, | Performed by: DERMATOLOGY

## 2020-06-03 PROCEDURE — 99214 PR OFFICE/OUTPT VISIT, EST, LEVL IV, 30-39 MIN: ICD-10-PCS | Mod: 25,S$PBB,, | Performed by: DERMATOLOGY

## 2020-06-03 PROCEDURE — 17000 DESTRUCT PREMALG LESION: CPT | Mod: S$PBB,,, | Performed by: DERMATOLOGY

## 2020-06-03 RX ORDER — TRIAMCINOLONE ACETONIDE 1 MG/G
CREAM TOPICAL
Qty: 60 G | Refills: 1 | Status: SHIPPED | OUTPATIENT
Start: 2020-06-03 | End: 2023-03-30 | Stop reason: SDUPTHER

## 2020-06-03 RX ORDER — FLUOROURACIL 50 MG/G
CREAM TOPICAL
Qty: 40 G | Refills: 1 | Status: SHIPPED | OUTPATIENT
Start: 2020-06-03

## 2020-06-03 NOTE — PROGRESS NOTES
"  Subjective:       Patient ID:  Rob Hickman is a 70 y.o. male who presents for   Chief Complaint   Patient presents with    Mole     LOV: 10-   70 y.o. male who presents for a mole on his L leg. Has been present for a "While" and it just looks funny. It has a white spot in the middle of it.   Also has clipped his toenail funny and now wont heal. He would like to have it checked to make sure its okay.         Phx BCC L arm approx - Dr Gomez excised   Pt admitted to high sun exposure & sunburns in youth   Father had skin ca- type unknown  Phx  numerous AK's  s/p cryo     Using 5fu to scalp intermittently. Rx is . Desires refill  Occasionally with bug bites from working outdoors. Tx with Rx steroid cream, desires refill.     Review of Systems   Skin: Positive for activity-related sunscreen use. Negative for itching, rash, dry skin, daily sunscreen use and recent sunburn.   Hematologic/Lymphatic: Does not bruise/bleed easily.       Past Medical History:   Diagnosis Date    Basal cell carcinoma     L arm approx          Objective:    Physical Exam   Constitutional: He appears well-developed and well-nourished. No distress.   HENT:   Mouth/Throat: Lips normal.    Eyes: Lids are normal.    Cardiovascular: There is no local extremity swelling and no dependent edema.     Neurological: He is alert and oriented to person, place, and time. He is not disoriented.   Psychiatric: He has a normal mood and affect. He is not agitated.   Skin:   Areas Examined (abnormalities noted in diagram):   Scalp / Hair Palpated and Inspected  Head / Face Inspection Performed  Neck Inspection Performed  Chest / Axilla Inspection Performed  Abdomen Inspection Performed  Back Inspection Performed  RUE Inspected  LUE Inspection Performed  RLE Inspected  LLE Inspection Performed  Nails and Digits Inspection Performed                           Diagram Legend     Erythematous scaling macule/papule c/w actinic keratosis    "    Vascular papule c/w angioma      Pigmented verrucoid papule/plaque c/w seborrheic keratosis      Yellow umbilicated papule c/w sebaceous hyperplasia      Irregularly shaped tan macule c/w lentigo     1-2 mm smooth white papules consistent with Milia      Movable subcutaneous cyst with punctum c/w epidermal inclusion cyst      Subcutaneous movable cyst c/w pilar cyst      Firm pink to brown papule c/w dermatofibroma      Pedunculated fleshy papule(s) c/w skin tag(s)      Evenly pigmented macule c/w junctional nevus     Mildly variegated pigmented, slightly irregular-bordered macule c/w mildly atypical nevus      Flesh colored to evenly pigmented papule c/w intradermal nevus       Pink pearly papule/plaque c/w basal cell carcinoma      Erythematous hyperkeratotic cursted plaque c/w SCC      Surgical scar with no sign of skin cancer recurrence      Open and closed comedones      Inflammatory papules and pustules      Verrucoid papule consistent consistent with wart     Erythematous eczematous patches and plaques     Dystrophic onycholytic nail with subungual debris c/w onychomycosis     Umbilicated papule    Erythematous-base heme-crusted tan verrucoid plaque consistent with inflamed seborrheic keratosis     Erythematous Silvery Scaling Plaque c/w Psoriasis     See annotation      Assessment / Plan:        Arthropod bite, initial encounter  -     triamcinolone acetonide 0.1% (KENALOG) 0.1 % cream; AAA bid  Dispense: 60 g; Refill: 1    Multiple actinic keratoses  Cryosurgery Procedure Note    Verbal consent from the patient is obtained and the patient is aware of the precancerous quality and need for treatment of these lesions. Liquid nitrogen cryosurgery is applied to the 9 actinic keratoses, as detailed in the physical exam, to produce a freeze injury. The patient is aware that blisters may form and is instructed on wound care with gentle cleansing and use of vaseline ointment to keep moist until healed. The  patient is supplied a handout on cryosurgery and is instructed to call if lesions do not completely resolve. Discussed risk postinflammatory pigmentary changes.     -     fluorouraciL (EFUDEX) 5 % cream; AAA scalp bid x 2 weeks, stop if severe irritation develops  Dispense: 40 g; Refill: 1    Skin cancer screening      Total body skin examination performed today including at least 12 points as noted in physical examination. No lesions suspicious for malignancy noted.      Personal history of other malignant neoplasm of skin  Area of previous NMSC examined. Site well healed with no signs of recurrence.    Total body skin examination performed today including at least 12 points as noted in physical examination. No lesions suspicious for malignancy noted.    Seborrheic keratoses, leg  These are benign inherited growths without a malignant potential. Reassurance given to patient. No treatment is necessary.       Ingrown toenail of left foot  -     Ambulatory referral/consult to Podiatry; Future; Expected date: 06/10/2020             Follow up in about 6 months (around 12/3/2020).

## 2020-06-10 ENCOUNTER — OFFICE VISIT (OUTPATIENT)
Dept: OPHTHALMOLOGY | Facility: CLINIC | Age: 70
End: 2020-06-10
Payer: MEDICARE

## 2020-06-10 DIAGNOSIS — H25.12 NUCLEAR SCLEROSIS OF LEFT EYE: ICD-10-CM

## 2020-06-10 DIAGNOSIS — H21.302: Primary | ICD-10-CM

## 2020-06-10 PROCEDURE — 99999 PR PBB SHADOW E&M-EST. PATIENT-LVL II: ICD-10-PCS | Mod: PBBFAC,,, | Performed by: OPHTHALMOLOGY

## 2020-06-10 PROCEDURE — 92014 PR EYE EXAM, EST PATIENT,COMPREHESV: ICD-10-PCS | Mod: S$PBB,,, | Performed by: OPHTHALMOLOGY

## 2020-06-10 PROCEDURE — 99999 PR PBB SHADOW E&M-EST. PATIENT-LVL II: CPT | Mod: PBBFAC,,, | Performed by: OPHTHALMOLOGY

## 2020-06-10 PROCEDURE — 99212 OFFICE O/P EST SF 10 MIN: CPT | Mod: PBBFAC,25 | Performed by: OPHTHALMOLOGY

## 2020-06-10 PROCEDURE — 76512 OPH US DX B-SCAN: CPT | Mod: 50,PBBFAC | Performed by: OPHTHALMOLOGY

## 2020-06-10 PROCEDURE — 92014 COMPRE OPH EXAM EST PT 1/>: CPT | Mod: S$PBB,,, | Performed by: OPHTHALMOLOGY

## 2020-06-10 PROCEDURE — 76512 B SCAN: ICD-10-PCS | Mod: 26,50,S$PBB, | Performed by: OPHTHALMOLOGY

## 2020-06-10 NOTE — PROGRESS NOTES
HPI     No eye gtts.   No eye sx    71 YO Male returns to clinic for yearly follow up. Past hx of cyst of   iris, ciliary body and anterior chamber of left eye. Mr. Hickman reports   that there are no changes in vision. No eye pain. No other ocular   complaints.     Last edited by Melinda Cummins on 6/10/2020  8:18 AM. (History)            Assessment /Plan     For exam results, see Encounter Report.    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  Will likely need phaco OS soon...    Cyst appears stable without noticeable growth.

## 2020-06-15 NOTE — PROGRESS NOTES
Subjective:      Patient ID: Rob Hickman is a 70 y.o. male.    Chief Complaint: Ingrown Toenail (L foot great toe, x2mths)      HPI:  Rob Hickman is a 70 y.o. male who presents to clinic with a chief complaint of painful ingrown toenail.  Patient reports having self treated his ingrown toenail for approximately 2 months.  He states that he was recommended to see podiatry by dermatology.  He rates pain as 1/10 on the pain scale when wearing shoes.  He also inquires about his left great toenail being discolored and if it's related to the ingrown toenail.  Patient denies any other pedal complaints at this time.    PCP:   [facility]  Date last seen: unknown    Review of Systems   Constitutional: Negative for appetite change, fever, chills, fatigue and unexpected weight change.   Respiratory: Negative for cough, wheezing, and shortness of breath.   Cardiovascular: Negative for chest pain, claudication, cyanosis, and leg swelling.  Endocrine:  Negative for intolerance to cold, intolerance to heat, polydipsia, polyphagia, and polyuria.    Gastrointestinal: Negative for nausea, vomiting, diarrhea, and constipation.   Musculoskeletal: Negative for back pain, arthritis, joint pain, joint swelling, myalgias, and stiffness.   Skin: Negative for rash, itching, poor wound healing, unusual hair distribution.  Positive for nail bed changes, discoloration, suspicious lesion.  Neurological: Negative for loss of balance, sensory change, paresthesias, and numbness.  Positive for pain.  Hematological: Negative for adenopathy, bleeding, and bruising easily.   Psychiatric/Behavioral: The patient is not nervous/anxious.  Negative for altered mental status.    Hemoglobin A1C   Date Value Ref Range Status   12/05/2011 5.6 4.0 - 6.2 % Final       Past Medical History:   Diagnosis Date    Basal cell carcinoma     L arm approx 2010     Past Surgical History:   Procedure Laterality Date    TONSILLECTOMY       "WISDOM TOOTH EXTRACTION       Family History   Problem Relation Age of Onset    Heart disease Mother     Cancer Maternal Grandfather         unknown    Cancer Paternal Grandfather         "all over"     Social History     Socioeconomic History    Marital status:      Spouse name: Not on file    Number of children: Not on file    Years of education: Not on file    Highest education level: Not on file   Occupational History    Not on file   Social Needs    Financial resource strain: Not on file    Food insecurity     Worry: Not on file     Inability: Not on file    Transportation needs     Medical: Not on file     Non-medical: Not on file   Tobacco Use    Smoking status: Never Smoker    Smokeless tobacco: Never Used   Substance and Sexual Activity    Alcohol use: No     Comment: rarely    Drug use: No    Sexual activity: Not on file   Lifestyle    Physical activity     Days per week: Not on file     Minutes per session: Not on file    Stress: Not on file   Relationships    Social connections     Talks on phone: Not on file     Gets together: Not on file     Attends [employer] service: Not on file     Active member of club or organization: Not on file     Attends meetings of clubs or organizations: Not on file     Relationship status: Not on file   Other Topics Concern    Not on file   Social History Narrative    Not on file           Objective:        Ht 5' 10.98" (1.803 m)   Wt 91.6 kg (202 lb)   BMI 28.19 kg/m²     Physical Exam   Constitutional: Patient is oriented to person, place, and time. Patient appears well-developed and well-nourished. No acute distress.     Psychiatric: Patient has a normal mood and affect. Patient's speech is normal and behavior is normal. Judgment is normal. Cognition and memory are normal.     Left pedal exam was performed today.  Vascular: Pedal pulses palpable 1/4 DP & PT.  CFT is = 3 seconds to the hallux.  Skin temperature is warm to warm proximal tibia " to distal toes without localized increase in calor noted.  No erythema, edema, or ecchymosis noted to the foot or ankle.  Hair growth present distally to the LE.     Musculoskeletal: Ankle joint ROM is decreased. Subtalar joint ROM is decreased.  Midtarsal joint ROM is decreased.  1st ray ROM is decreased.  1st MTPJ ROM is decreased.  Ankle joint dorsiflexion is restricted with the knee extended and flexed per Silfverskiold exam.  Muscle strength is 5/5 for all LE muscle groups tested.    Neurological: Epicritic sensation is grossly Intact to the foot.   Light touch sensation is Intact to the foot.   Achilles DTR and Chaddock STR are Intact to left lower extremity.   Negative Babinski sign left lower extremity.  Negative clonus sign left lower extremity.  Tenderness to palpation noted to the left hallux lateral nail border and adjacent soft tissue mass.    Dermatological:  Left hallux toenail is thickened, dystrophic, brittle, discolored, and mycotic with subungal debris present.  Toenails 2-5 left are WNL in length, color, and thickness.  Webspaces 1-4 left are clean, dry, and intact.  Skin turgor is supple.  No dry, flaky skin noted to the LE.  No open wounds or suspicious pigmented lesions appreciable to the foot or ankle.    Nursing note and vitals reviewed.        Assessment:       Encounter Diagnoses   Name Primary?    Onychocryptosis Yes    Pyogenic granuloma of skin     Onychomycosis due to dermatophyte     Pain around toenail, left foot          Plan:       Rob was seen today for ingrown toenail.    Diagnoses and all orders for this visit:    Onychocryptosis  -     Ambulatory referral/consult to Podiatry    Pyogenic granuloma of skin    Onychomycosis due to dermatophyte    Pain around toenail, left foot      I counseled the patient on his conditions, their implications and medical management.    - Discussed with patient onychocryptosis and pyogenic granuloma including that wound has healed but lesion  is rubbing on shoes.  Recommended patient perform conservative treatment options and RTC in a few weeks if pain persists for excision of pyogenic granuloma with or without PNA.  Advised patient to apply antifungal cream and bandaid daily until ingrown toenail completely grows out.  Patient was amenable with treatment plan.    - Educated patient on treatment options for toenail fungus.  Patient opted for prescription compounded topical antifungal solution.    - Prescribed topical compound antifungal nail solution through [facility].    - Dispensed silicone toe spacer to patient.    Patient was given the following recommendations and instructions:  Patient Instructions   - Perform ingrown toenail care daily after shower via applying antifungal cream and covering with bandaid.    - Apply white vinegar soaked cotton balls to affected toenails for 5 minutes daily or perform 25% white vinegar and 75% lukewarm water foot soaks for 10 minutes daily to reduce fungal debris under toenails.  Apply thin layer of Vicks vaporub on top of and under affected nails to create environment that doesn't support fungal growth.  Don't perform any of these antifungal therapies if open wounds are present.    - Apply topical antifungal solution twice daily as directed.    - Notify clinic if any new or worsening condition arises.          Anna Owens DPM        Dictation was performed using M*Modal Fluency.  Transcription errors may be present.

## 2020-06-16 ENCOUNTER — OFFICE VISIT (OUTPATIENT)
Dept: PODIATRY | Facility: CLINIC | Age: 70
End: 2020-06-16
Payer: MEDICARE

## 2020-06-16 VITALS — BODY MASS INDEX: 28.28 KG/M2 | WEIGHT: 202 LBS | HEIGHT: 71 IN

## 2020-06-16 DIAGNOSIS — M79.675 PAIN AROUND TOENAIL, LEFT FOOT: ICD-10-CM

## 2020-06-16 DIAGNOSIS — L60.0 ONYCHOCRYPTOSIS: Primary | ICD-10-CM

## 2020-06-16 DIAGNOSIS — B35.1 ONYCHOMYCOSIS DUE TO DERMATOPHYTE: ICD-10-CM

## 2020-06-16 DIAGNOSIS — L98.0 PYOGENIC GRANULOMA OF SKIN: ICD-10-CM

## 2020-06-16 PROCEDURE — 99213 OFFICE O/P EST LOW 20 MIN: CPT | Mod: PBBFAC,PN | Performed by: PODIATRIST

## 2020-06-16 PROCEDURE — 99204 OFFICE O/P NEW MOD 45 MIN: CPT | Mod: S$PBB,,, | Performed by: PODIATRIST

## 2020-06-16 PROCEDURE — 99999 PR PBB SHADOW E&M-EST. PATIENT-LVL III: CPT | Mod: PBBFAC,,, | Performed by: PODIATRIST

## 2020-06-16 PROCEDURE — 99204 PR OFFICE/OUTPT VISIT, NEW, LEVL IV, 45-59 MIN: ICD-10-PCS | Mod: S$PBB,,, | Performed by: PODIATRIST

## 2020-06-16 PROCEDURE — 99999 PR PBB SHADOW E&M-EST. PATIENT-LVL III: ICD-10-PCS | Mod: PBBFAC,,, | Performed by: PODIATRIST

## 2020-06-16 NOTE — PATIENT INSTRUCTIONS
- Perform ingrown toenail care daily after shower via applying antifungal cream and covering with bandaid.    - Apply white vinegar soaked cotton balls to affected toenails for 5 minutes daily or perform 25% white vinegar and 75% lukewarm water foot soaks for 10 minutes daily to reduce fungal debris under toenails.  Apply thin layer of Vicks vaporub on top of and under affected nails to create environment that doesn't support fungal growth.  Don't perform any of these antifungal therapies if open wounds are present.    - Apply topical antifungal solution twice daily as directed.    - Notify clinic if any new or worsening condition arises.

## 2020-08-03 NOTE — PROGRESS NOTES
Subjective:      Patient ID: Rob Hickman is a 70 y.o. male.    Chief Complaint: Ingrown Toenail      HPI:  Rob Hickman is a 70 y.o. male who presents to clinic with a chief complaint of onychomycosis.  Patient reports following treatment recommendations and no longer experiencing ingrown toenail pain.  He inquires if procedure still needs to be performed.  He states he received topical toenail fungus medication but has not started using it yet as he was unsure if he should until he confirmed he could stop performing ingrown toenail care.  Patient denies any other pedal complaints at this time.    PCP:   [facility]  Date last seen: unknown    Review of Systems   Constitutional: Negative for appetite change, fever, chills, fatigue and unexpected weight change.   Respiratory: Negative for cough, wheezing, and shortness of breath.   Cardiovascular: Negative for chest pain, claudication, cyanosis, and leg swelling.  Musculoskeletal: Negative for back pain, arthritis, joint pain, joint swelling, myalgias, and stiffness.   Skin: Negative for rash, itching, poor wound healing, unusual hair distribution, suspicious lesion.  Positive for nail bed changes, discoloration.  Neurological: Negative for loss of balance, sensory change, paresthesias, and numbness.    Hematological: Negative for adenopathy, bleeding, and bruising easily.   Psychiatric/Behavioral: The patient is not nervous/anxious.  Negative for altered mental status.    Hemoglobin A1C   Date Value Ref Range Status   12/05/2011 5.6 4.0 - 6.2 % Final       Past Medical History:   Diagnosis Date    Basal cell carcinoma     L arm approx 2010    Onychocryptosis 6/16/2020     Past Surgical History:   Procedure Laterality Date    TONSILLECTOMY      WISDOM TOOTH EXTRACTION       Family History   Problem Relation Age of Onset    Heart disease Mother     Cancer Maternal Grandfather         unknown    Cancer Paternal Grandfather          ""all over"     Social History     Socioeconomic History    Marital status:      Spouse name: Not on file    Number of children: Not on file    Years of education: Not on file    Highest education level: Not on file   Occupational History    Not on file   Social Needs    Financial resource strain: Not on file    Food insecurity     Worry: Not on file     Inability: Not on file    Transportation needs     Medical: Not on file     Non-medical: Not on file   Tobacco Use    Smoking status: Never Smoker    Smokeless tobacco: Never Used   Substance and Sexual Activity    Alcohol use: No     Comment: rarely    Drug use: No    Sexual activity: Not on file   Lifestyle    Physical activity     Days per week: Not on file     Minutes per session: Not on file    Stress: Not on file   Relationships    Social connections     Talks on phone: Not on file     Gets together: Not on file     Attends Orthodoxy service: Not on file     Active member of club or organization: Not on file     Attends meetings of clubs or organizations: Not on file     Relationship status: Not on file   Other Topics Concern    Not on file   Social History Narrative    Not on file           Objective:        Ht 5' 10" (1.778 m)   Wt 91.6 kg (202 lb)   BMI 28.98 kg/m²     Physical Exam   Constitutional: Patient is oriented to person, place, and time. Patient appears well-developed and well-nourished. No acute distress.     Psychiatric: Patient has a normal mood and affect. Patient's speech is normal and behavior is normal. Judgment is normal. Cognition and memory are normal.     Left pedal exam was performed today.  Vascular: Pedal pulses palpable 1/4 DP & PT.  CFT is = 3 seconds to the hallux.  Skin temperature is warm to warm proximal tibia to distal toes without localized increase in calor noted.  No erythema, edema, or ecchymosis noted to the foot or ankle.  Hair growth present distally to the LE.     Musculoskeletal: Ankle and " pedal joint ROM are decreased.  Ankle joint dorsiflexion is restricted with the knee extended and flexed per Silfverskiold exam.  Muscle strength is 5/5 for all LE muscle groups tested.    Neurological: Epicritic sensation is grossly Intact to the foot.   Light touch sensation is Intact to the foot.   Chaddock STR is Intact to left lower extremity.   No tenderness to palpation noted to the left hallux lateral nail border.    Dermatological:  Left hallux toenail is thickened, dystrophic, brittle, discolored, and mycotic with subungal debris present.  Toenails 2-5 left are WNL in length, color, and thickness.  Webspaces 1-4 left are clean, dry, and intact.  Skin turgor is supple.  No dry, flaky skin noted to the LE.  No open wounds or suspicious pigmented lesions appreciable to the foot or ankle.    Nursing note and vitals reviewed.        Assessment:       Encounter Diagnosis   Name Primary?    Onychomycosis due to dermatophyte Yes         Plan:       Rob was seen today for ingrown toenail.    Diagnoses and all orders for this visit:    Onychomycosis due to dermatophyte      I counseled the patient on his conditions, their implications and medical management.    - Discussed with patient continuing with treatment for toenail fungus via prescription compounded topical antifungal solution.    - Advised patient to continue wearing silicone toe spacer to prevent recurrence of ingrown toenail and instructed him to resume ingrown nail care as previously instructed if ingrown nail pain recurs.  Patient verbalized all understanding.    Patient was given the following recommendations and instructions:  Patient Instructions   - Apply white vinegar soaked cotton balls to affected toenails for 5 minutes daily or perform 25% white vinegar and 75% lukewarm water foot soaks for 10 minutes daily to reduce fungal debris under toenails.  Apply thin layer of Vicks vaporub on top of and under affected nails to create environment that  doesn't support fungal growth.  Don't perform any of these antifungal therapies if open wounds are present.    - Apply topical antifungal solution twice daily as directed.    - Notify clinic if any new or worsening condition arises.          Anna Owens DPM        Dictation was performed using M*Modal Fluency.  Transcription errors may be present.

## 2020-08-04 ENCOUNTER — OFFICE VISIT (OUTPATIENT)
Dept: PODIATRY | Facility: CLINIC | Age: 70
End: 2020-08-04
Payer: MEDICARE

## 2020-08-04 VITALS — HEIGHT: 70 IN | WEIGHT: 202 LBS | BODY MASS INDEX: 28.92 KG/M2

## 2020-08-04 DIAGNOSIS — B35.1 ONYCHOMYCOSIS DUE TO DERMATOPHYTE: Primary | ICD-10-CM

## 2020-08-04 PROCEDURE — 99999 PR PBB SHADOW E&M-EST. PATIENT-LVL III: CPT | Mod: PBBFAC,,, | Performed by: PODIATRIST

## 2020-08-04 PROCEDURE — 99212 PR OFFICE/OUTPT VISIT, EST, LEVL II, 10-19 MIN: ICD-10-PCS | Mod: S$PBB,,, | Performed by: PODIATRIST

## 2020-08-04 PROCEDURE — 99999 PR PBB SHADOW E&M-EST. PATIENT-LVL III: ICD-10-PCS | Mod: PBBFAC,,, | Performed by: PODIATRIST

## 2020-08-04 PROCEDURE — 99213 OFFICE O/P EST LOW 20 MIN: CPT | Mod: PBBFAC,PN | Performed by: PODIATRIST

## 2020-08-04 PROCEDURE — 99212 OFFICE O/P EST SF 10 MIN: CPT | Mod: S$PBB,,, | Performed by: PODIATRIST

## 2020-08-16 PROBLEM — L60.0 ONYCHOCRYPTOSIS: Status: RESOLVED | Noted: 2020-06-16 | Resolved: 2020-08-16

## 2020-08-17 NOTE — PATIENT INSTRUCTIONS
- Apply white vinegar soaked cotton balls to affected toenails for 5 minutes daily or perform 25% white vinegar and 75% lukewarm water foot soaks for 10 minutes daily to reduce fungal debris under toenails.  Apply thin layer of Vicks vaporub on top of and under affected nails to create environment that doesn't support fungal growth.  Don't perform any of these antifungal therapies if open wounds are present.    - Apply topical antifungal solution twice daily as directed.    - Notify clinic if any new or worsening condition arises.

## 2020-11-11 ENCOUNTER — OFFICE VISIT (OUTPATIENT)
Dept: PODIATRY | Facility: CLINIC | Age: 70
End: 2020-11-11
Payer: MEDICARE

## 2020-11-11 VITALS — HEIGHT: 70 IN | BODY MASS INDEX: 28.91 KG/M2 | WEIGHT: 201.94 LBS

## 2020-11-11 DIAGNOSIS — B35.1 ONYCHOMYCOSIS DUE TO DERMATOPHYTE: Primary | ICD-10-CM

## 2020-11-11 PROCEDURE — 99999 PR PBB SHADOW E&M-EST. PATIENT-LVL III: CPT | Mod: PBBFAC,,, | Performed by: PODIATRIST

## 2020-11-11 PROCEDURE — 99213 OFFICE O/P EST LOW 20 MIN: CPT | Mod: PBBFAC,PN | Performed by: PODIATRIST

## 2020-11-11 PROCEDURE — 99213 PR OFFICE/OUTPT VISIT, EST, LEVL III, 20-29 MIN: ICD-10-PCS | Mod: S$PBB,,, | Performed by: PODIATRIST

## 2020-11-11 PROCEDURE — 99213 OFFICE O/P EST LOW 20 MIN: CPT | Mod: S$PBB,,, | Performed by: PODIATRIST

## 2020-11-11 PROCEDURE — 99999 PR PBB SHADOW E&M-EST. PATIENT-LVL III: ICD-10-PCS | Mod: PBBFAC,,, | Performed by: PODIATRIST

## 2020-12-21 NOTE — PROGRESS NOTES
"    Subjective:      Patient ID: Rob Hickman is a 70 y.o. male.    Chief Complaint: Nail Care (3 mo ) and Nail Problem (left great toe )      HPI:  Rob Hickman is a 70 y.o. male who presents to clinic with a chief complaint of onychomycosis.  Patient reports following treatment recommendations and no longer experiencing ingrown toenail pain.  He informs of using topical toenail fungus medication but has not used it twice daily as he forgets about the second application most days.  Patient denies any other pedal complaints at this time.    PCP:   [facility] [place]  Date last seen: unknown    Review of Systems   Constitutional: Negative for appetite change, fever, chills, fatigue and unexpected weight change.   Respiratory: Negative for cough, wheezing, and shortness of breath.   Cardiovascular: Negative for chest pain, claudication, cyanosis, and leg swelling.  Musculoskeletal: Negative for back pain, arthritis, joint pain, joint swelling, myalgias, and stiffness.   Skin: Negative for rash, itching, poor wound healing, unusual hair distribution, suspicious lesion.  Positive for nail bed changes, discoloration.  Neurological: Negative for loss of balance, sensory change, paresthesias, and numbness.    Hematological: Negative for adenopathy, bleeding, and bruising easily.   Psychiatric/Behavioral: The patient is not nervous/anxious.  Negative for altered mental status.    Hemoglobin A1C   Date Value Ref Range Status   12/05/2011 5.6 4.0 - 6.2 % Final       Past Medical History:   Diagnosis Date    Basal cell carcinoma     L arm approx 2010    Onychocryptosis 6/16/2020     Past Surgical History:   Procedure Laterality Date    TONSILLECTOMY      WISDOM TOOTH EXTRACTION       Family History   Problem Relation Age of Onset    Heart disease Mother     Cancer Maternal Grandfather         unknown    Cancer Paternal Grandfather         "all over"     Social History     Socioeconomic History    " "Marital status:      Spouse name: Not on file    Number of children: Not on file    Years of education: Not on file    Highest education level: Not on file   Occupational History    Not on file   Social Needs    Financial resource strain: Not on file    Food insecurity     Worry: Not on file     Inability: Not on file    Transportation needs     Medical: Not on file     Non-medical: Not on file   Tobacco Use    Smoking status: Never Smoker    Smokeless tobacco: Never Used   Substance and Sexual Activity    Alcohol use: No     Comment: rarely    Drug use: No    Sexual activity: Not on file   Lifestyle    Physical activity     Days per week: Not on file     Minutes per session: Not on file    Stress: Not on file   Relationships    Social connections     Talks on phone: Not on file     Gets together: Not on file     Attends Sabianist service: Not on file     Active member of club or organization: Not on file     Attends meetings of clubs or organizations: Not on file     Relationship status: Not on file   Other Topics Concern    Not on file   Social History Narrative    Not on file           Objective:        Ht 5' 10" (1.778 m)   Wt 91.6 kg (201 lb 15.1 oz)   BMI 28.98 kg/m²     Physical Exam   Constitutional: Patient is oriented to person, place, and time. Patient appears well-developed and well-nourished. No acute distress.     Psychiatric: Patient has a normal mood and affect. Patient's speech is normal and behavior is normal. Judgment is normal. Cognition and memory are normal.     Left pedal exam was performed today.  Vascular: Pedal pulses palpable 1/4 DP & PT.  CFT is = 3 seconds to the hallux.  Skin temperature is warm to warm proximal tibia to distal toes without localized increase in calor noted.  No erythema, edema, or ecchymosis noted to the foot or ankle.  Hair growth present distally to the LE.     Musculoskeletal: Ankle and pedal joint ROM are decreased.  Ankle joint " dorsiflexion is restricted with the knee extended and flexed per Silfverskiold exam.  Muscle strength is 5/5 for all LE muscle groups tested.    Neurological: Epicritic sensation is grossly Intact to the foot.   Light touch sensation is Intact to the foot.   Chaddock STR is Intact to left lower extremity.   No tenderness to palpation noted to the left hallux toenail.    Dermatological:  Left hallux toenail is thickened, dystrophic, brittle, discolored, and mycotic with subungal debris present.  Toenails 2-5 left are WNL in length, color, and thickness.  Webspaces 1-4 left are clean, dry, and intact.  Skin turgor is supple.  No dry, flaky skin, open wounds, or suspicious pigmented lesions appreciable to the foot or ankle.    Nursing note and vitals reviewed.        Assessment:       Encounter Diagnosis   Name Primary?    Onychomycosis due to dermatophyte Yes         Plan:       Rob was seen today for nail care and nail problem.    Diagnoses and all orders for this visit:    Onychomycosis due to dermatophyte      I counseled the patient on his conditions, their implications and medical management.    - Advised patient to continue applying compounded topical antifungal solution to affected toenail twice daily as prescribed.    - With the patient's permission, toenails 1, 2, 3, 4, and 5 of the right foot and 1, 2, 3, 4, and 5 of the left foot were debrided in length and thickness via nail nippers and electric  to patient's tolerance without incident - performed as a courtesy to patient today.    Patient was given the following recommendations and instructions:  Patient Instructions   - Apply white vinegar soaked cotton balls to affected toenails for 5 minutes daily or perform 25% white vinegar and 75% lukewarm water foot soaks for 10 minutes daily to reduce fungal debris under toenails.  Apply thin layer of Vicks vaporub on top of and under affected nails to create environment that doesn't support fungal  growth.  Don't perform any of these antifungal therapies if open wounds are present.    - Apply topical antifungal solution twice daily as directed.    - Notify clinic if any new or worsening condition arises.          Anna Owens DPM        Dictation was performed using M*Modal Fluency.  Transcription errors may be present.

## 2021-02-03 ENCOUNTER — OFFICE VISIT (OUTPATIENT)
Dept: OPHTHALMOLOGY | Facility: CLINIC | Age: 71
End: 2021-02-03
Payer: MEDICARE

## 2021-02-03 DIAGNOSIS — H25.13 NUCLEAR SCLEROSIS, BILATERAL: Primary | ICD-10-CM

## 2021-02-03 DIAGNOSIS — Z01.818 PRE-OP TESTING: ICD-10-CM

## 2021-02-03 DIAGNOSIS — H21.302: ICD-10-CM

## 2021-02-03 PROCEDURE — 99214 OFFICE O/P EST MOD 30 MIN: CPT | Mod: S$PBB,,, | Performed by: OPHTHALMOLOGY

## 2021-02-03 PROCEDURE — 99999 PR PBB SHADOW E&M-EST. PATIENT-LVL III: CPT | Mod: PBBFAC,,, | Performed by: OPHTHALMOLOGY

## 2021-02-03 PROCEDURE — 99213 OFFICE O/P EST LOW 20 MIN: CPT | Mod: PBBFAC,25 | Performed by: OPHTHALMOLOGY

## 2021-02-03 PROCEDURE — 99214 PR OFFICE/OUTPT VISIT, EST, LEVL IV, 30-39 MIN: ICD-10-PCS | Mod: S$PBB,,, | Performed by: OPHTHALMOLOGY

## 2021-02-03 PROCEDURE — 92136 OPHTHALMIC BIOMETRY: CPT | Mod: PBBFAC | Performed by: OPHTHALMOLOGY

## 2021-02-03 PROCEDURE — 99999 PR PBB SHADOW E&M-EST. PATIENT-LVL III: ICD-10-PCS | Mod: PBBFAC,,, | Performed by: OPHTHALMOLOGY

## 2021-02-03 PROCEDURE — 92136 IOL MASTER - OU - BOTH EYES: ICD-10-PCS | Mod: 26,S$PBB,LT, | Performed by: OPHTHALMOLOGY

## 2021-02-03 RX ORDER — TROPICAMIDE 10 MG/ML
1 SOLUTION/ DROPS OPHTHALMIC
Status: CANCELLED | OUTPATIENT
Start: 2021-02-03

## 2021-02-03 RX ORDER — TETRACAINE HYDROCHLORIDE 5 MG/ML
1 SOLUTION OPHTHALMIC
Status: CANCELLED | OUTPATIENT
Start: 2021-02-03

## 2021-02-03 RX ORDER — MOXIFLOXACIN 5 MG/ML
1 SOLUTION/ DROPS OPHTHALMIC
Status: CANCELLED | OUTPATIENT
Start: 2021-02-03

## 2021-02-03 RX ORDER — SODIUM CHLORIDE 0.9 % (FLUSH) 0.9 %
10 SYRINGE (ML) INJECTION
Status: DISCONTINUED | OUTPATIENT
Start: 2021-02-03 | End: 2023-05-04

## 2021-02-03 RX ORDER — PREDNISOLONE ACETATE-GATIFLOXACIN-BROMFENAC .75; 5; 1 MG/ML; MG/ML; MG/ML
1 SUSPENSION/ DROPS OPHTHALMIC 3 TIMES DAILY
Qty: 5 ML | Refills: 3 | Status: SHIPPED | OUTPATIENT
Start: 2021-02-03 | End: 2023-03-30

## 2021-02-03 RX ORDER — PHENYLEPHRINE HYDROCHLORIDE 25 MG/ML
1 SOLUTION/ DROPS OPHTHALMIC
Status: CANCELLED | OUTPATIENT
Start: 2021-02-03

## 2021-02-12 DIAGNOSIS — H25.12 NUCLEAR SCLEROTIC CATARACT OF LEFT EYE: Primary | ICD-10-CM

## 2021-02-24 ENCOUNTER — OFFICE VISIT (OUTPATIENT)
Dept: PODIATRY | Facility: CLINIC | Age: 71
End: 2021-02-24
Payer: MEDICARE

## 2021-02-24 VITALS
SYSTOLIC BLOOD PRESSURE: 135 MMHG | WEIGHT: 201.94 LBS | BODY MASS INDEX: 28.91 KG/M2 | HEIGHT: 70 IN | HEART RATE: 58 BPM | DIASTOLIC BLOOD PRESSURE: 81 MMHG

## 2021-02-24 DIAGNOSIS — B35.1 ONYCHOMYCOSIS DUE TO DERMATOPHYTE: Primary | ICD-10-CM

## 2021-02-24 PROCEDURE — 99999 PR PBB SHADOW E&M-EST. PATIENT-LVL III: CPT | Mod: PBBFAC,,, | Performed by: PODIATRIST

## 2021-02-24 PROCEDURE — 99999 PR PBB SHADOW E&M-EST. PATIENT-LVL III: ICD-10-PCS | Mod: PBBFAC,,, | Performed by: PODIATRIST

## 2021-02-24 PROCEDURE — 99212 PR OFFICE/OUTPT VISIT, EST, LEVL II, 10-19 MIN: ICD-10-PCS | Mod: S$PBB,,, | Performed by: PODIATRIST

## 2021-02-24 PROCEDURE — 99213 OFFICE O/P EST LOW 20 MIN: CPT | Mod: PBBFAC,PN | Performed by: PODIATRIST

## 2021-02-24 PROCEDURE — 99212 OFFICE O/P EST SF 10 MIN: CPT | Mod: S$PBB,,, | Performed by: PODIATRIST

## 2021-02-24 RX ORDER — AMOXICILLIN 500 MG/1
CAPSULE ORAL
COMMUNITY
Start: 2021-02-23 | End: 2021-03-09

## 2021-03-08 ENCOUNTER — LAB VISIT (OUTPATIENT)
Dept: FAMILY MEDICINE | Facility: CLINIC | Age: 71
End: 2021-03-08
Payer: MEDICARE

## 2021-03-08 ENCOUNTER — TELEPHONE (OUTPATIENT)
Dept: OPHTHALMOLOGY | Facility: CLINIC | Age: 71
End: 2021-03-08

## 2021-03-08 DIAGNOSIS — Z01.818 PRE-OP TESTING: ICD-10-CM

## 2021-03-08 PROCEDURE — U0003 INFECTIOUS AGENT DETECTION BY NUCLEIC ACID (DNA OR RNA); SEVERE ACUTE RESPIRATORY SYNDROME CORONAVIRUS 2 (SARS-COV-2) (CORONAVIRUS DISEASE [COVID-19]), AMPLIFIED PROBE TECHNIQUE, MAKING USE OF HIGH THROUGHPUT TECHNOLOGIES AS DESCRIBED BY CMS-2020-01-R: HCPCS | Performed by: OPHTHALMOLOGY

## 2021-03-08 PROCEDURE — U0005 INFEC AGEN DETEC AMPLI PROBE: HCPCS | Performed by: OPHTHALMOLOGY

## 2021-03-09 LAB — SARS-COV-2 RNA RESP QL NAA+PROBE: NOT DETECTED

## 2021-03-11 ENCOUNTER — ANESTHESIA (OUTPATIENT)
Dept: SURGERY | Facility: OTHER | Age: 71
End: 2021-03-11
Payer: MEDICARE

## 2021-03-11 ENCOUNTER — ANESTHESIA EVENT (OUTPATIENT)
Dept: SURGERY | Facility: OTHER | Age: 71
End: 2021-03-11
Payer: MEDICARE

## 2021-03-11 ENCOUNTER — HOSPITAL ENCOUNTER (OUTPATIENT)
Facility: OTHER | Age: 71
Discharge: HOME | End: 2021-03-11
Attending: OPHTHALMOLOGY | Admitting: OPHTHALMOLOGY
Payer: MEDICARE

## 2021-03-11 VITALS
RESPIRATION RATE: 18 BRPM | SYSTOLIC BLOOD PRESSURE: 139 MMHG | TEMPERATURE: 98 F | HEIGHT: 71 IN | HEART RATE: 66 BPM | DIASTOLIC BLOOD PRESSURE: 79 MMHG | BODY MASS INDEX: 26.88 KG/M2 | OXYGEN SATURATION: 100 % | WEIGHT: 192 LBS

## 2021-03-11 DIAGNOSIS — H25.13 NUCLEAR SCLEROSIS, BILATERAL: ICD-10-CM

## 2021-03-11 PROCEDURE — 66984 PR REMOVAL, CATARACT, W/INSRT INTRAOC LENS, W/O ENDO CYCLO: ICD-10-PCS | Mod: LT,,, | Performed by: OPHTHALMOLOGY

## 2021-03-11 PROCEDURE — V2632 POST CHMBR INTRAOCULAR LENS: HCPCS | Performed by: OPHTHALMOLOGY

## 2021-03-11 PROCEDURE — 37000009 HC ANESTHESIA EA ADD 15 MINS: Performed by: OPHTHALMOLOGY

## 2021-03-11 PROCEDURE — 66984 XCAPSL CTRC RMVL W/O ECP: CPT | Mod: LT,,, | Performed by: OPHTHALMOLOGY

## 2021-03-11 PROCEDURE — 25000003 PHARM REV CODE 250: Performed by: OPHTHALMOLOGY

## 2021-03-11 PROCEDURE — 71000015 HC POSTOP RECOV 1ST HR: Performed by: OPHTHALMOLOGY

## 2021-03-11 PROCEDURE — 63600175 PHARM REV CODE 636 W HCPCS: Performed by: NURSE ANESTHETIST, CERTIFIED REGISTERED

## 2021-03-11 PROCEDURE — 36000707: Performed by: OPHTHALMOLOGY

## 2021-03-11 PROCEDURE — 36000706: Performed by: OPHTHALMOLOGY

## 2021-03-11 PROCEDURE — 37000008 HC ANESTHESIA 1ST 15 MINUTES: Performed by: OPHTHALMOLOGY

## 2021-03-11 DEVICE — IMPLANTABLE DEVICE: Type: IMPLANTABLE DEVICE | Site: EYE | Status: FUNCTIONAL

## 2021-03-11 RX ORDER — TETRACAINE HYDROCHLORIDE 5 MG/ML
1 SOLUTION OPHTHALMIC
Status: COMPLETED | OUTPATIENT
Start: 2021-03-11 | End: 2021-03-11

## 2021-03-11 RX ORDER — PROPARACAINE HYDROCHLORIDE 5 MG/ML
1 SOLUTION/ DROPS OPHTHALMIC
Status: DISCONTINUED | OUTPATIENT
Start: 2021-03-11 | End: 2021-03-11 | Stop reason: HOSPADM

## 2021-03-11 RX ORDER — TROPICAMIDE 10 MG/ML
1 SOLUTION/ DROPS OPHTHALMIC
Status: COMPLETED | OUTPATIENT
Start: 2021-03-11 | End: 2021-03-11

## 2021-03-11 RX ORDER — TETRACAINE HYDROCHLORIDE 5 MG/ML
SOLUTION OPHTHALMIC
Status: DISCONTINUED | OUTPATIENT
Start: 2021-03-11 | End: 2021-03-11 | Stop reason: HOSPADM

## 2021-03-11 RX ORDER — MOXIFLOXACIN 5 MG/ML
1 SOLUTION/ DROPS OPHTHALMIC
Status: COMPLETED | OUTPATIENT
Start: 2021-03-11 | End: 2021-03-11

## 2021-03-11 RX ORDER — LIDOCAINE HYDROCHLORIDE 40 MG/ML
INJECTION, SOLUTION RETROBULBAR
Status: DISCONTINUED | OUTPATIENT
Start: 2021-03-11 | End: 2021-03-11 | Stop reason: HOSPADM

## 2021-03-11 RX ORDER — ACETAMINOPHEN 325 MG/1
650 TABLET ORAL EVERY 4 HOURS PRN
Status: DISCONTINUED | OUTPATIENT
Start: 2021-03-11 | End: 2021-03-11 | Stop reason: HOSPADM

## 2021-03-11 RX ORDER — MOXIFLOXACIN 5 MG/ML
SOLUTION/ DROPS OPHTHALMIC
Status: DISCONTINUED | OUTPATIENT
Start: 2021-03-11 | End: 2021-03-11 | Stop reason: HOSPADM

## 2021-03-11 RX ORDER — PHENYLEPHRINE HYDROCHLORIDE 25 MG/ML
1 SOLUTION/ DROPS OPHTHALMIC
Status: COMPLETED | OUTPATIENT
Start: 2021-03-11 | End: 2021-03-11

## 2021-03-11 RX ORDER — MIDAZOLAM HYDROCHLORIDE 1 MG/ML
INJECTION INTRAMUSCULAR; INTRAVENOUS
Status: DISCONTINUED | OUTPATIENT
Start: 2021-03-11 | End: 2021-03-11

## 2021-03-11 RX ADMIN — PHENYLEPHRINE HYDROCHLORIDE 1 DROP: 25 SOLUTION/ DROPS OPHTHALMIC at 10:03

## 2021-03-11 RX ADMIN — MOXIFLOXACIN 1 DROP: 5 SOLUTION/ DROPS OPHTHALMIC at 11:03

## 2021-03-11 RX ADMIN — TETRACAINE HYDROCHLORIDE 1 DROP: 5 SOLUTION OPHTHALMIC at 10:03

## 2021-03-11 RX ADMIN — TROPICAMIDE 1 DROP: 10 SOLUTION/ DROPS OPHTHALMIC at 10:03

## 2021-03-11 RX ADMIN — MOXIFLOXACIN 1 DROP: 5 SOLUTION/ DROPS OPHTHALMIC at 10:03

## 2021-03-11 RX ADMIN — MIDAZOLAM HYDROCHLORIDE 2 MG: 1 INJECTION, SOLUTION INTRAMUSCULAR; INTRAVENOUS at 11:03

## 2021-03-11 RX ADMIN — MOXIFLOXACIN 1 DROP: 5 SOLUTION/ DROPS OPHTHALMIC at 12:03

## 2021-03-12 ENCOUNTER — OFFICE VISIT (OUTPATIENT)
Dept: OPHTHALMOLOGY | Facility: CLINIC | Age: 71
End: 2021-03-12
Attending: OPHTHALMOLOGY
Payer: MEDICARE

## 2021-03-12 DIAGNOSIS — Z98.890 POST-OPERATIVE STATE: Primary | ICD-10-CM

## 2021-03-12 DIAGNOSIS — H25.12 NUCLEAR SCLEROSIS OF LEFT EYE: ICD-10-CM

## 2021-03-12 PROCEDURE — 99024 POSTOP FOLLOW-UP VISIT: CPT | Mod: POP,,, | Performed by: OPHTHALMOLOGY

## 2021-03-12 PROCEDURE — 99999 PR PBB SHADOW E&M-EST. PATIENT-LVL III: CPT | Mod: PBBFAC,,, | Performed by: OPHTHALMOLOGY

## 2021-03-12 PROCEDURE — 99213 OFFICE O/P EST LOW 20 MIN: CPT | Mod: PBBFAC | Performed by: OPHTHALMOLOGY

## 2021-03-12 PROCEDURE — 99999 PR PBB SHADOW E&M-EST. PATIENT-LVL III: ICD-10-PCS | Mod: PBBFAC,,, | Performed by: OPHTHALMOLOGY

## 2021-03-12 PROCEDURE — 99024 PR POST-OP FOLLOW-UP VISIT: ICD-10-PCS | Mod: POP,,, | Performed by: OPHTHALMOLOGY

## 2021-03-23 ENCOUNTER — OFFICE VISIT (OUTPATIENT)
Dept: OPHTHALMOLOGY | Facility: CLINIC | Age: 71
End: 2021-03-23
Attending: OPHTHALMOLOGY
Payer: MEDICARE

## 2021-03-23 DIAGNOSIS — H25.13 NUCLEAR SCLEROSIS, BILATERAL: ICD-10-CM

## 2021-03-23 DIAGNOSIS — Z98.890 POST-OPERATIVE STATE: Primary | ICD-10-CM

## 2021-03-23 PROCEDURE — 92136 IOL MASTER - OU - BOTH EYES: ICD-10-PCS | Mod: 26,S$PBB,RT, | Performed by: OPHTHALMOLOGY

## 2021-03-23 PROCEDURE — 99999 PR PBB SHADOW E&M-EST. PATIENT-LVL II: CPT | Mod: PBBFAC,,, | Performed by: OPHTHALMOLOGY

## 2021-03-23 PROCEDURE — 92136 OPHTHALMIC BIOMETRY: CPT | Mod: PBBFAC | Performed by: OPHTHALMOLOGY

## 2021-03-23 PROCEDURE — 99212 OFFICE O/P EST SF 10 MIN: CPT | Mod: PBBFAC | Performed by: OPHTHALMOLOGY

## 2021-03-23 PROCEDURE — 99999 PR PBB SHADOW E&M-EST. PATIENT-LVL II: ICD-10-PCS | Mod: PBBFAC,,, | Performed by: OPHTHALMOLOGY

## 2021-03-23 PROCEDURE — 99024 PR POST-OP FOLLOW-UP VISIT: ICD-10-PCS | Mod: POP,,, | Performed by: OPHTHALMOLOGY

## 2021-03-23 PROCEDURE — 99024 POSTOP FOLLOW-UP VISIT: CPT | Mod: POP,,, | Performed by: OPHTHALMOLOGY

## 2021-03-23 RX ORDER — PHENYLEPHRINE HYDROCHLORIDE 25 MG/ML
1 SOLUTION/ DROPS OPHTHALMIC
Status: CANCELLED | OUTPATIENT
Start: 2021-03-23

## 2021-03-23 RX ORDER — TETRACAINE HYDROCHLORIDE 5 MG/ML
1 SOLUTION OPHTHALMIC
Status: CANCELLED | OUTPATIENT
Start: 2021-03-23

## 2021-03-23 RX ORDER — TROPICAMIDE 10 MG/ML
1 SOLUTION/ DROPS OPHTHALMIC
Status: CANCELLED | OUTPATIENT
Start: 2021-03-23

## 2021-03-23 RX ORDER — SODIUM CHLORIDE 0.9 % (FLUSH) 0.9 %
10 SYRINGE (ML) INJECTION
Status: DISCONTINUED | OUTPATIENT
Start: 2021-03-23 | End: 2023-05-04

## 2021-03-23 RX ORDER — MOXIFLOXACIN 5 MG/ML
1 SOLUTION/ DROPS OPHTHALMIC
Status: CANCELLED | OUTPATIENT
Start: 2021-03-23

## 2021-03-30 DIAGNOSIS — H25.11 NUCLEAR SCLEROTIC CATARACT OF RIGHT EYE: Primary | ICD-10-CM

## 2021-04-15 ENCOUNTER — TELEPHONE (OUTPATIENT)
Dept: OPHTHALMOLOGY | Facility: CLINIC | Age: 71
End: 2021-04-15

## 2021-04-16 ENCOUNTER — LAB VISIT (OUTPATIENT)
Dept: FAMILY MEDICINE | Facility: CLINIC | Age: 71
End: 2021-04-16
Payer: MEDICARE

## 2021-04-16 DIAGNOSIS — Z01.818 PRE-OP TESTING: ICD-10-CM

## 2021-04-16 PROCEDURE — U0003 INFECTIOUS AGENT DETECTION BY NUCLEIC ACID (DNA OR RNA); SEVERE ACUTE RESPIRATORY SYNDROME CORONAVIRUS 2 (SARS-COV-2) (CORONAVIRUS DISEASE [COVID-19]), AMPLIFIED PROBE TECHNIQUE, MAKING USE OF HIGH THROUGHPUT TECHNOLOGIES AS DESCRIBED BY CMS-2020-01-R: HCPCS | Performed by: OPHTHALMOLOGY

## 2021-04-16 PROCEDURE — U0005 INFEC AGEN DETEC AMPLI PROBE: HCPCS | Performed by: OPHTHALMOLOGY

## 2021-04-17 LAB — SARS-COV-2 RNA RESP QL NAA+PROBE: NOT DETECTED

## 2021-04-19 ENCOUNTER — ANESTHESIA EVENT (OUTPATIENT)
Dept: SURGERY | Facility: OTHER | Age: 71
End: 2021-04-19
Payer: MEDICARE

## 2021-04-19 ENCOUNTER — ANESTHESIA (OUTPATIENT)
Dept: SURGERY | Facility: OTHER | Age: 71
End: 2021-04-19
Payer: MEDICARE

## 2021-04-19 ENCOUNTER — HOSPITAL ENCOUNTER (OUTPATIENT)
Facility: OTHER | Age: 71
Discharge: HOME | End: 2021-04-19
Attending: OPHTHALMOLOGY | Admitting: OPHTHALMOLOGY
Payer: MEDICARE

## 2021-04-19 VITALS
TEMPERATURE: 98 F | HEART RATE: 52 BPM | OXYGEN SATURATION: 97 % | HEIGHT: 71 IN | DIASTOLIC BLOOD PRESSURE: 75 MMHG | WEIGHT: 190 LBS | BODY MASS INDEX: 26.6 KG/M2 | SYSTOLIC BLOOD PRESSURE: 138 MMHG | RESPIRATION RATE: 18 BRPM

## 2021-04-19 DIAGNOSIS — H25.11 NUCLEAR SCLEROTIC CATARACT OF RIGHT EYE: Primary | ICD-10-CM

## 2021-04-19 PROCEDURE — V2632 POST CHMBR INTRAOCULAR LENS: HCPCS | Performed by: OPHTHALMOLOGY

## 2021-04-19 PROCEDURE — 25000003 PHARM REV CODE 250: Performed by: OPHTHALMOLOGY

## 2021-04-19 PROCEDURE — 66984 PR REMOVAL, CATARACT, W/INSRT INTRAOC LENS, W/O ENDO CYCLO: ICD-10-PCS | Mod: 79,RT,, | Performed by: OPHTHALMOLOGY

## 2021-04-19 PROCEDURE — 66984 XCAPSL CTRC RMVL W/O ECP: CPT | Mod: 79,RT,, | Performed by: OPHTHALMOLOGY

## 2021-04-19 PROCEDURE — 36000707: Performed by: OPHTHALMOLOGY

## 2021-04-19 PROCEDURE — 63600175 PHARM REV CODE 636 W HCPCS: Performed by: NURSE ANESTHETIST, CERTIFIED REGISTERED

## 2021-04-19 PROCEDURE — 36000706: Performed by: OPHTHALMOLOGY

## 2021-04-19 PROCEDURE — 37000009 HC ANESTHESIA EA ADD 15 MINS: Performed by: OPHTHALMOLOGY

## 2021-04-19 PROCEDURE — 71000015 HC POSTOP RECOV 1ST HR: Performed by: OPHTHALMOLOGY

## 2021-04-19 PROCEDURE — 37000008 HC ANESTHESIA 1ST 15 MINUTES: Performed by: OPHTHALMOLOGY

## 2021-04-19 DEVICE — IMPLANTABLE DEVICE: Type: IMPLANTABLE DEVICE | Site: EYE | Status: FUNCTIONAL

## 2021-04-19 RX ORDER — TETRACAINE HYDROCHLORIDE 5 MG/ML
1 SOLUTION OPHTHALMIC
Status: COMPLETED | OUTPATIENT
Start: 2021-04-19 | End: 2021-04-19

## 2021-04-19 RX ORDER — SODIUM CHLORIDE 0.9 % (FLUSH) 0.9 %
10 SYRINGE (ML) INJECTION
Status: DISCONTINUED | OUTPATIENT
Start: 2021-04-19 | End: 2021-04-19 | Stop reason: HOSPADM

## 2021-04-19 RX ORDER — FENTANYL CITRATE 50 UG/ML
INJECTION, SOLUTION INTRAMUSCULAR; INTRAVENOUS
Status: DISCONTINUED | OUTPATIENT
Start: 2021-04-19 | End: 2021-04-19

## 2021-04-19 RX ORDER — PROPARACAINE HYDROCHLORIDE 5 MG/ML
1 SOLUTION/ DROPS OPHTHALMIC
Status: DISCONTINUED | OUTPATIENT
Start: 2021-04-19 | End: 2021-04-19 | Stop reason: HOSPADM

## 2021-04-19 RX ORDER — LIDOCAINE HYDROCHLORIDE 40 MG/ML
INJECTION, SOLUTION RETROBULBAR
Status: DISCONTINUED | OUTPATIENT
Start: 2021-04-19 | End: 2021-04-19 | Stop reason: HOSPADM

## 2021-04-19 RX ORDER — MOXIFLOXACIN 5 MG/ML
1 SOLUTION/ DROPS OPHTHALMIC
Status: COMPLETED | OUTPATIENT
Start: 2021-04-19 | End: 2021-04-19

## 2021-04-19 RX ORDER — TROPICAMIDE 10 MG/ML
1 SOLUTION/ DROPS OPHTHALMIC
Status: COMPLETED | OUTPATIENT
Start: 2021-04-19 | End: 2021-04-19

## 2021-04-19 RX ORDER — TETRACAINE HYDROCHLORIDE 5 MG/ML
SOLUTION OPHTHALMIC
Status: DISCONTINUED | OUTPATIENT
Start: 2021-04-19 | End: 2021-04-19 | Stop reason: HOSPADM

## 2021-04-19 RX ORDER — ACETAMINOPHEN 325 MG/1
650 TABLET ORAL EVERY 4 HOURS PRN
Status: DISCONTINUED | OUTPATIENT
Start: 2021-04-19 | End: 2021-04-19 | Stop reason: HOSPADM

## 2021-04-19 RX ORDER — MOXIFLOXACIN 5 MG/ML
SOLUTION/ DROPS OPHTHALMIC
Status: DISCONTINUED | OUTPATIENT
Start: 2021-04-19 | End: 2021-04-19 | Stop reason: HOSPADM

## 2021-04-19 RX ORDER — MIDAZOLAM HYDROCHLORIDE 1 MG/ML
INJECTION INTRAMUSCULAR; INTRAVENOUS
Status: DISCONTINUED | OUTPATIENT
Start: 2021-04-19 | End: 2021-04-19

## 2021-04-19 RX ORDER — PHENYLEPHRINE HYDROCHLORIDE 25 MG/ML
1 SOLUTION/ DROPS OPHTHALMIC
Status: COMPLETED | OUTPATIENT
Start: 2021-04-19 | End: 2021-04-19

## 2021-04-19 RX ADMIN — TROPICAMIDE 1 DROP: 10 SOLUTION/ DROPS OPHTHALMIC at 07:04

## 2021-04-19 RX ADMIN — MOXIFLOXACIN 1 DROP: 5 SOLUTION/ DROPS OPHTHALMIC at 07:04

## 2021-04-19 RX ADMIN — FENTANYL CITRATE 100 MCG: 50 INJECTION, SOLUTION INTRAMUSCULAR; INTRAVENOUS at 08:04

## 2021-04-19 RX ADMIN — TETRACAINE HYDROCHLORIDE 1 DROP: 5 SOLUTION OPHTHALMIC at 07:04

## 2021-04-19 RX ADMIN — PHENYLEPHRINE HYDROCHLORIDE 1 DROP: 25 SOLUTION/ DROPS OPHTHALMIC at 07:04

## 2021-04-19 RX ADMIN — MOXIFLOXACIN 1 DROP: 5 SOLUTION/ DROPS OPHTHALMIC at 08:04

## 2021-04-19 RX ADMIN — MIDAZOLAM HYDROCHLORIDE 1 MG: 1 INJECTION, SOLUTION INTRAMUSCULAR; INTRAVENOUS at 08:04

## 2021-04-20 ENCOUNTER — OFFICE VISIT (OUTPATIENT)
Dept: OPHTHALMOLOGY | Facility: CLINIC | Age: 71
End: 2021-04-20
Attending: OPHTHALMOLOGY
Payer: MEDICARE

## 2021-04-20 DIAGNOSIS — Z98.890 POST-OPERATIVE STATE: Primary | ICD-10-CM

## 2021-04-20 DIAGNOSIS — H25.11 NUCLEAR SCLEROSIS OF RIGHT EYE: ICD-10-CM

## 2021-04-20 PROCEDURE — 99024 POSTOP FOLLOW-UP VISIT: CPT | Mod: POP,,, | Performed by: OPHTHALMOLOGY

## 2021-04-20 PROCEDURE — 99024 PR POST-OP FOLLOW-UP VISIT: ICD-10-PCS | Mod: POP,,, | Performed by: OPHTHALMOLOGY

## 2021-04-20 PROCEDURE — 99999 PR PBB SHADOW E&M-EST. PATIENT-LVL III: ICD-10-PCS | Mod: PBBFAC,,, | Performed by: OPHTHALMOLOGY

## 2021-04-20 PROCEDURE — 99213 OFFICE O/P EST LOW 20 MIN: CPT | Mod: PBBFAC | Performed by: OPHTHALMOLOGY

## 2021-04-20 PROCEDURE — 99999 PR PBB SHADOW E&M-EST. PATIENT-LVL III: CPT | Mod: PBBFAC,,, | Performed by: OPHTHALMOLOGY

## 2021-04-29 ENCOUNTER — PATIENT MESSAGE (OUTPATIENT)
Dept: RESEARCH | Facility: HOSPITAL | Age: 71
End: 2021-04-29

## 2021-05-21 ENCOUNTER — OFFICE VISIT (OUTPATIENT)
Dept: OPTOMETRY | Facility: CLINIC | Age: 71
End: 2021-05-21
Payer: MEDICARE

## 2021-05-21 DIAGNOSIS — Z98.41 S/P BILATERAL CATARACT EXTRACTION: Primary | ICD-10-CM

## 2021-05-21 DIAGNOSIS — Z98.42 S/P BILATERAL CATARACT EXTRACTION: Primary | ICD-10-CM

## 2021-05-21 PROCEDURE — 99212 OFFICE O/P EST SF 10 MIN: CPT | Mod: PBBFAC | Performed by: OPTOMETRIST

## 2021-05-21 PROCEDURE — 92134 CPTRZ OPH DX IMG PST SGM RTA: CPT | Mod: PBBFAC | Performed by: OPTOMETRIST

## 2021-05-21 PROCEDURE — 99999 PR PBB SHADOW E&M-EST. PATIENT-LVL II: ICD-10-PCS | Mod: PBBFAC,,, | Performed by: OPTOMETRIST

## 2021-05-21 PROCEDURE — 99999 PR PBB SHADOW E&M-EST. PATIENT-LVL II: CPT | Mod: PBBFAC,,, | Performed by: OPTOMETRIST

## 2021-05-21 PROCEDURE — 99024 POSTOP FOLLOW-UP VISIT: CPT | Mod: POP,,, | Performed by: OPTOMETRIST

## 2021-05-21 PROCEDURE — 99024 PR POST-OP FOLLOW-UP VISIT: ICD-10-PCS | Mod: POP,,, | Performed by: OPTOMETRIST

## 2021-07-20 ENCOUNTER — PATIENT MESSAGE (OUTPATIENT)
Dept: PODIATRY | Facility: CLINIC | Age: 71
End: 2021-07-20

## 2021-09-14 ENCOUNTER — TELEPHONE (OUTPATIENT)
Dept: PODIATRY | Facility: CLINIC | Age: 71
End: 2021-09-14

## 2023-02-24 ENCOUNTER — PES CALL (OUTPATIENT)
Dept: ADMINISTRATIVE | Facility: CLINIC | Age: 73
End: 2023-02-24
Payer: MEDICARE

## 2023-03-30 ENCOUNTER — OFFICE VISIT (OUTPATIENT)
Dept: FAMILY MEDICINE | Facility: CLINIC | Age: 73
End: 2023-03-30
Payer: MEDICARE

## 2023-03-30 VITALS
HEIGHT: 71 IN | OXYGEN SATURATION: 97 % | SYSTOLIC BLOOD PRESSURE: 130 MMHG | WEIGHT: 202.63 LBS | HEART RATE: 61 BPM | BODY MASS INDEX: 28.37 KG/M2 | DIASTOLIC BLOOD PRESSURE: 72 MMHG

## 2023-03-30 DIAGNOSIS — Z13.6 ENCOUNTER FOR SCREENING FOR CARDIOVASCULAR DISORDERS: ICD-10-CM

## 2023-03-30 DIAGNOSIS — Z12.11 COLON CANCER SCREENING: ICD-10-CM

## 2023-03-30 DIAGNOSIS — Z00.00 ENCOUNTER FOR PREVENTIVE HEALTH EXAMINATION: Primary | ICD-10-CM

## 2023-03-30 DIAGNOSIS — H91.90 DECREASED HEARING, UNSPECIFIED LATERALITY: ICD-10-CM

## 2023-03-30 DIAGNOSIS — E05.90 THYROTOXICOSIS WITHOUT THYROID STORM, UNSPECIFIED THYROTOXICOSIS TYPE: ICD-10-CM

## 2023-03-30 PROCEDURE — 99214 OFFICE O/P EST MOD 30 MIN: CPT | Mod: PBBFAC,PO | Performed by: NURSE PRACTITIONER

## 2023-03-30 PROCEDURE — 99999 PR PBB SHADOW E&M-EST. PATIENT-LVL IV: ICD-10-PCS | Mod: PBBFAC,,, | Performed by: NURSE PRACTITIONER

## 2023-03-30 PROCEDURE — 99999 PR PBB SHADOW E&M-EST. PATIENT-LVL IV: CPT | Mod: PBBFAC,,, | Performed by: NURSE PRACTITIONER

## 2023-03-30 PROCEDURE — G0439 PPPS, SUBSEQ VISIT: HCPCS | Mod: S$GLB,,, | Performed by: NURSE PRACTITIONER

## 2023-03-30 PROCEDURE — G0439 PR MEDICARE ANNUAL WELLNESS SUBSEQUENT VISIT: ICD-10-PCS | Mod: S$GLB,,, | Performed by: NURSE PRACTITIONER

## 2023-03-30 RX ORDER — TRIAMCINOLONE ACETONIDE 1 MG/G
CREAM TOPICAL
Qty: 60 G | Refills: 1 | Status: SHIPPED | OUTPATIENT
Start: 2023-03-30

## 2023-03-30 NOTE — PROGRESS NOTES
"  Rob Hickman presented for a  Medicare AWV and comprehensive Health Risk Assessment today. The following components were reviewed and updated:    Medical history  Family History  Social history  Allergies and Current Medications  Health Risk Assessment  Health Maintenance  Care Team     ** See Completed Assessments for Annual Wellness Visit within the encounter summary.**     The following assessments were completed:  Living Situation  CAGE  Depression Screening  Timed Get Up and Go  Whisper Test  Cognitive Function Screening      Nutrition Screening  ADL Screening  PAQ Screening    Review for Opioid Screening: Pt does not have Rx for Opioids  Review for Substance Use Disorders: Patient does not use substance      Vitals:    03/30/23 0842   BP: 130/72   BP Location: Left arm   Patient Position: Sitting   BP Method: Medium (Manual)   Pulse: 61   SpO2: 97%   Weight: 91.9 kg (202 lb 9.6 oz)   Height: 5' 11" (1.803 m)     Body mass index is 28.26 kg/m².  Physical Exam  Vitals reviewed.   Constitutional:       General: He is not in acute distress.  HENT:      Right Ear: Tympanic membrane, ear canal and external ear normal. There is no impacted cerumen.      Left Ear: Tympanic membrane, ear canal and external ear normal. There is no impacted cerumen.   Cardiovascular:      Rate and Rhythm: Normal rate and regular rhythm.      Pulses: Normal pulses.      Heart sounds: Normal heart sounds.   Pulmonary:      Effort: Pulmonary effort is normal. No respiratory distress.      Breath sounds: Normal breath sounds.   Skin:     General: Skin is warm and dry.   Neurological:      Mental Status: He is alert and oriented to person, place, and time.   Psychiatric:         Mood and Affect: Mood normal.         Behavior: Behavior normal.         Thought Content: Thought content normal.         Judgment: Judgment normal.     Diagnoses and health risks identified today and associated recommendations/orders:    1. Encounter for " preventive health examination  Reviewed and discussed health maintenance.    - Ambulatory referral/consult to Audiology; Future  - triamcinolone acetonide 0.1% (KENALOG) 0.1 % cream; AAA bid  Dispense: 60 g; Refill: 1  - Case Request Endoscopy: COLONOSCOPY  - Comprehensive Metabolic Panel; Future  - Lipid Panel; Future  - TSH; Future  - Hepatitis C Antibody; Future    2. Encounter for screening for cardiovascular disorders  - Comprehensive Metabolic Panel; Future  - Lipid Panel; Future    3. Decreased hearing, unspecified laterality  - Ambulatory referral/consult to Audiology; Future    5. Colon cancer screening  - Case Request Endoscopy: COLONOSCOPY    6. Thyrotoxicosis without thyroid storm, unspecified thyrotoxicosis type  - TSH; Future    Provided Rob with a 5-10 year written screening schedule and personal prevention plan. Recommendations were developed using the USPSTF age appropriate recommendations. Education, counseling, and referrals were provided as needed. After Visit Summary printed and given to patient which includes a list of additional screenings\tests needed.    I offered to discuss advanced care planning, including how to pick a person who would make decisions for you if you were unable to make them for yourself, called a health care power of , and what kind of decisions you might make such as use of life sustaining treatments such as ventilators and tube feeding when faced with a life limiting illness recorded on a living will that they will need to know. (How you want to be cared for as you near the end of your natural life)   X Patient is interested in learning more about how to make advanced directives.  I provided them paperwork and offered to discuss this with them.  Hilary Franz NP

## 2023-03-30 NOTE — PATIENT INSTRUCTIONS
Counseling and Referral of Other Preventative  (Italic type indicates deductible and co-insurance are waived)    Patient Name: Rob Hickman  Today's Date: 3/30/2023    Health Maintenance       Date Due Completion Date    Hepatitis C Screening Never done ---    COVID-19 Vaccine (1) Never done ---    TETANUS VACCINE Never done ---    Shingles Vaccine (1 of 2) Never done ---    Lipid Panel 10/27/2015 10/27/2010    Pneumococcal Vaccines (Age 65+) (1 - PCV) Never done ---    Colorectal Cancer Screening 12/09/2019 12/9/2009    Influenza Vaccine (1) 09/01/2022 11/1/2018        No orders of the defined types were placed in this encounter.      The following information is provided to all patients.  This information is to help you find resources for any of the problems found today that may be affecting your health:                Living healthy guide: www.[facility].[place].gov      Understanding Diabetes: www.diabetes.org      Eating healthy: www.cdc.gov/healthyweight      CDC home safety checklist: www.cdc.gov/steadi/patient.html      Agency on Aging: www.goea.[place].[facility]      Alcoholics anonymous (AA): www.aa.org      Physical Activity: www.dayana.nih.gov/zx0xftx      Tobacco use: www.quitwithusla.org

## 2023-04-03 ENCOUNTER — CLINICAL SUPPORT (OUTPATIENT)
Dept: AUDIOLOGY | Facility: CLINIC | Age: 73
End: 2023-04-03
Payer: MEDICARE

## 2023-04-03 DIAGNOSIS — H93.13 TINNITUS, BILATERAL: Primary | ICD-10-CM

## 2023-04-03 DIAGNOSIS — H90.3 BILATERAL SENSORINEURAL HEARING LOSS: ICD-10-CM

## 2023-04-03 PROCEDURE — 92557 PR COMPREHENSIVE HEARING TEST: ICD-10-PCS | Mod: S$GLB,,, | Performed by: AUDIOLOGIST-HEARING AID FITTER

## 2023-04-03 PROCEDURE — 99999 PR PBB SHADOW E&M-EST. PATIENT-LVL I: ICD-10-PCS | Mod: PBBFAC,,,

## 2023-04-03 PROCEDURE — 99999 PR PBB SHADOW E&M-EST. PATIENT-LVL I: CPT | Mod: PBBFAC,,,

## 2023-04-03 PROCEDURE — 92557 COMPREHENSIVE HEARING TEST: CPT | Mod: S$GLB,,, | Performed by: AUDIOLOGIST-HEARING AID FITTER

## 2023-04-03 PROCEDURE — 99211 OFF/OP EST MAY X REQ PHY/QHP: CPT | Mod: PBBFAC,PO

## 2023-04-03 PROCEDURE — 92567 TYMPANOMETRY: CPT | Mod: S$GLB,,, | Performed by: AUDIOLOGIST-HEARING AID FITTER

## 2023-04-03 PROCEDURE — 92567 PR TYMPA2METRY: ICD-10-PCS | Mod: S$GLB,,, | Performed by: AUDIOLOGIST-HEARING AID FITTER

## 2023-04-03 PROCEDURE — 92557 COMPREHENSIVE HEARING TEST: CPT | Mod: PBBFAC,PO | Performed by: AUDIOLOGIST-HEARING AID FITTER

## 2023-04-03 PROCEDURE — 92567 TYMPANOMETRY: CPT | Mod: PBBFAC,PO | Performed by: AUDIOLOGIST-HEARING AID FITTER

## 2023-04-03 NOTE — PROGRESS NOTES
Rob Hickman was seen 04/03/2023 for an audiological evaluation. Pertinent complaints today include hearing loss and tinnitus, AS>AD. 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 Otoscopy revealed minimal cerumen in the right ear. The tympanic membrane was visualized AU prior to proceeding with the hearing testing.     Results reveal a normal sloping to severe sensorineural hearing loss for the right ear and  normal sloping to profound sensorineural hearing loss for the left ear.    Speech Reception Thresholds were  20 dBHL for the right ear and 20 dBHL for the left ear.    Word recognition scores were fair for the right ear and fair for the left ear.   Tympanograms were Type A for the right ear and Type A for the left ear.    Audiogram results were reviewed in detail with patient and all questions were answered. Results will be reviewed by the referring provider at the completion of this note. Recommend further medical evaluation with an ENT provider for the the high freq asymmetry, AS>AD, binaural amplification pending medical clearance, repeat hearing testing in one year due to noise exposure and asymmetry and bilateral hearing protection with either muffs or in-ear protection in loud noises.

## 2023-04-03 NOTE — Clinical Note
Hearing testing completed. Results reveal a normal sloping to severesensorineural hearing loss for the right ear and  normal sloping to profound sensorineural hearing loss for the left ear.    Speech Reception Thresholds were  20 dBHL for the right ear and 20 dBHL for the left ear.    Word recognition scores were fair for the right ear and fair for the left ear.   Tympanograms were Type A for the right ear and Type A for the left ear. Recommend further medical evaluation with an ENT provider for the the high freq asymmetry, AS>AD, binaural amplification pending medical clearance and repeat hearing testing in one year due to noise exposure and asymmetry.

## 2023-05-01 ENCOUNTER — LAB VISIT (OUTPATIENT)
Dept: LAB | Facility: HOSPITAL | Age: 73
End: 2023-05-01
Attending: NURSE PRACTITIONER
Payer: MEDICARE

## 2023-05-01 DIAGNOSIS — E05.90 THYROTOXICOSIS WITHOUT THYROID STORM, UNSPECIFIED THYROTOXICOSIS TYPE: ICD-10-CM

## 2023-05-01 DIAGNOSIS — Z00.00 ENCOUNTER FOR PREVENTIVE HEALTH EXAMINATION: ICD-10-CM

## 2023-05-01 DIAGNOSIS — Z13.6 ENCOUNTER FOR SCREENING FOR CARDIOVASCULAR DISORDERS: ICD-10-CM

## 2023-05-01 LAB
ALBUMIN SERPL BCP-MCNC: 3.6 G/DL (ref 3.5–5.2)
ALP SERPL-CCNC: 114 U/L (ref 55–135)
ALT SERPL W/O P-5'-P-CCNC: 13 U/L (ref 10–44)
ANION GAP SERPL CALC-SCNC: 7 MMOL/L (ref 8–16)
AST SERPL-CCNC: 24 U/L (ref 10–40)
BILIRUB SERPL-MCNC: 0.6 MG/DL (ref 0.1–1)
BUN SERPL-MCNC: 15 MG/DL (ref 8–23)
CALCIUM SERPL-MCNC: 9.7 MG/DL (ref 8.7–10.5)
CHLORIDE SERPL-SCNC: 107 MMOL/L (ref 95–110)
CHOLEST SERPL-MCNC: 176 MG/DL (ref 120–199)
CHOLEST/HDLC SERPL: 3.3 {RATIO} (ref 2–5)
CO2 SERPL-SCNC: 26 MMOL/L (ref 23–29)
CREAT SERPL-MCNC: 1.1 MG/DL (ref 0.5–1.4)
EST. GFR  (NO RACE VARIABLE): >60 ML/MIN/1.73 M^2
GLUCOSE SERPL-MCNC: 96 MG/DL (ref 70–110)
HCV AB SERPL QL IA: NORMAL
HDLC SERPL-MCNC: 53 MG/DL (ref 40–75)
HDLC SERPL: 30.1 % (ref 20–50)
LDLC SERPL CALC-MCNC: 113 MG/DL (ref 63–159)
NONHDLC SERPL-MCNC: 123 MG/DL
POTASSIUM SERPL-SCNC: 4.6 MMOL/L (ref 3.5–5.1)
PROT SERPL-MCNC: 6.6 G/DL (ref 6–8.4)
SODIUM SERPL-SCNC: 140 MMOL/L (ref 136–145)
TRIGL SERPL-MCNC: 50 MG/DL (ref 30–150)
TSH SERPL DL<=0.005 MIU/L-ACNC: 1.83 UIU/ML (ref 0.4–4)

## 2023-05-01 PROCEDURE — 86803 HEPATITIS C AB TEST: CPT | Performed by: NURSE PRACTITIONER

## 2023-05-01 PROCEDURE — 84443 ASSAY THYROID STIM HORMONE: CPT | Performed by: NURSE PRACTITIONER

## 2023-05-01 PROCEDURE — 36415 COLL VENOUS BLD VENIPUNCTURE: CPT | Mod: PN | Performed by: NURSE PRACTITIONER

## 2023-05-01 PROCEDURE — 80053 COMPREHEN METABOLIC PANEL: CPT | Performed by: NURSE PRACTITIONER

## 2023-05-01 PROCEDURE — 80061 LIPID PANEL: CPT | Performed by: NURSE PRACTITIONER

## 2023-05-04 ENCOUNTER — OFFICE VISIT (OUTPATIENT)
Dept: FAMILY MEDICINE | Facility: CLINIC | Age: 73
End: 2023-05-04
Payer: MEDICARE

## 2023-05-04 VITALS
DIASTOLIC BLOOD PRESSURE: 78 MMHG | RESPIRATION RATE: 18 BRPM | HEIGHT: 71 IN | SYSTOLIC BLOOD PRESSURE: 132 MMHG | BODY MASS INDEX: 28.55 KG/M2 | WEIGHT: 203.94 LBS

## 2023-05-04 DIAGNOSIS — H90.3 BILATERAL SENSORINEURAL HEARING LOSS: ICD-10-CM

## 2023-05-04 DIAGNOSIS — Z00.00 PREVENTATIVE HEALTH CARE: Primary | ICD-10-CM

## 2023-05-04 DIAGNOSIS — B35.1 ONYCHOMYCOSIS DUE TO DERMATOPHYTE: ICD-10-CM

## 2023-05-04 DIAGNOSIS — L57.0 ACTINIC KERATOSIS OF SCALP: ICD-10-CM

## 2023-05-04 DIAGNOSIS — H93.13 TINNITUS OF BOTH EARS: ICD-10-CM

## 2023-05-04 PROBLEM — H25.11 NUCLEAR SCLEROTIC CATARACT OF RIGHT EYE: Status: RESOLVED | Noted: 2021-04-19 | Resolved: 2023-05-04

## 2023-05-04 PROBLEM — E05.00 GRAVES DISEASE: Chronic | Status: ACTIVE | Noted: 2023-05-04

## 2023-05-04 PROBLEM — L98.0 PYOGENIC GRANULOMA OF SKIN: Status: RESOLVED | Noted: 2020-06-16 | Resolved: 2023-05-04

## 2023-05-04 PROBLEM — E05.00 GRAVES DISEASE: Chronic | Status: RESOLVED | Noted: 2023-05-04 | Resolved: 2023-05-04

## 2023-05-04 PROBLEM — H25.13 NUCLEAR SCLEROSIS, BILATERAL: Status: RESOLVED | Noted: 2021-03-11 | Resolved: 2023-05-04

## 2023-05-04 PROBLEM — M79.675 PAIN AROUND TOENAIL, LEFT FOOT: Status: RESOLVED | Noted: 2020-06-16 | Resolved: 2023-05-04

## 2023-05-04 PROCEDURE — 1159F PR MEDICATION LIST DOCUMENTED IN MEDICAL RECORD: ICD-10-PCS | Mod: CPTII,S$GLB,, | Performed by: STUDENT IN AN ORGANIZED HEALTH CARE EDUCATION/TRAINING PROGRAM

## 2023-05-04 PROCEDURE — 3078F PR MOST RECENT DIASTOLIC BLOOD PRESSURE < 80 MM HG: ICD-10-PCS | Mod: CPTII,S$GLB,, | Performed by: STUDENT IN AN ORGANIZED HEALTH CARE EDUCATION/TRAINING PROGRAM

## 2023-05-04 PROCEDURE — 3008F BODY MASS INDEX DOCD: CPT | Mod: CPTII,S$GLB,, | Performed by: STUDENT IN AN ORGANIZED HEALTH CARE EDUCATION/TRAINING PROGRAM

## 2023-05-04 PROCEDURE — 3288F PR FALLS RISK ASSESSMENT DOCUMENTED: ICD-10-PCS | Mod: CPTII,S$GLB,, | Performed by: STUDENT IN AN ORGANIZED HEALTH CARE EDUCATION/TRAINING PROGRAM

## 2023-05-04 PROCEDURE — 3078F DIAST BP <80 MM HG: CPT | Mod: CPTII,S$GLB,, | Performed by: STUDENT IN AN ORGANIZED HEALTH CARE EDUCATION/TRAINING PROGRAM

## 2023-05-04 PROCEDURE — 99999 PR PBB SHADOW E&M-EST. PATIENT-LVL III: ICD-10-PCS | Mod: PBBFAC,,, | Performed by: STUDENT IN AN ORGANIZED HEALTH CARE EDUCATION/TRAINING PROGRAM

## 2023-05-04 PROCEDURE — 3288F FALL RISK ASSESSMENT DOCD: CPT | Mod: CPTII,S$GLB,, | Performed by: STUDENT IN AN ORGANIZED HEALTH CARE EDUCATION/TRAINING PROGRAM

## 2023-05-04 PROCEDURE — 99397 PER PM REEVAL EST PAT 65+ YR: CPT | Mod: S$GLB,,, | Performed by: STUDENT IN AN ORGANIZED HEALTH CARE EDUCATION/TRAINING PROGRAM

## 2023-05-04 PROCEDURE — 3075F PR MOST RECENT SYSTOLIC BLOOD PRESS GE 130-139MM HG: ICD-10-PCS | Mod: CPTII,S$GLB,, | Performed by: STUDENT IN AN ORGANIZED HEALTH CARE EDUCATION/TRAINING PROGRAM

## 2023-05-04 PROCEDURE — 1126F PR PAIN SEVERITY QUANTIFIED, NO PAIN PRESENT: ICD-10-PCS | Mod: CPTII,S$GLB,, | Performed by: STUDENT IN AN ORGANIZED HEALTH CARE EDUCATION/TRAINING PROGRAM

## 2023-05-04 PROCEDURE — 99999 PR PBB SHADOW E&M-EST. PATIENT-LVL III: CPT | Mod: PBBFAC,,, | Performed by: STUDENT IN AN ORGANIZED HEALTH CARE EDUCATION/TRAINING PROGRAM

## 2023-05-04 PROCEDURE — 3075F SYST BP GE 130 - 139MM HG: CPT | Mod: CPTII,S$GLB,, | Performed by: STUDENT IN AN ORGANIZED HEALTH CARE EDUCATION/TRAINING PROGRAM

## 2023-05-04 PROCEDURE — 1159F MED LIST DOCD IN RCRD: CPT | Mod: CPTII,S$GLB,, | Performed by: STUDENT IN AN ORGANIZED HEALTH CARE EDUCATION/TRAINING PROGRAM

## 2023-05-04 PROCEDURE — 3008F PR BODY MASS INDEX (BMI) DOCUMENTED: ICD-10-PCS | Mod: CPTII,S$GLB,, | Performed by: STUDENT IN AN ORGANIZED HEALTH CARE EDUCATION/TRAINING PROGRAM

## 2023-05-04 PROCEDURE — 1101F PT FALLS ASSESS-DOCD LE1/YR: CPT | Mod: CPTII,S$GLB,, | Performed by: STUDENT IN AN ORGANIZED HEALTH CARE EDUCATION/TRAINING PROGRAM

## 2023-05-04 PROCEDURE — 1160F PR REVIEW ALL MEDS BY PRESCRIBER/CLIN PHARMACIST DOCUMENTED: ICD-10-PCS | Mod: CPTII,S$GLB,, | Performed by: STUDENT IN AN ORGANIZED HEALTH CARE EDUCATION/TRAINING PROGRAM

## 2023-05-04 PROCEDURE — 1160F RVW MEDS BY RX/DR IN RCRD: CPT | Mod: CPTII,S$GLB,, | Performed by: STUDENT IN AN ORGANIZED HEALTH CARE EDUCATION/TRAINING PROGRAM

## 2023-05-04 PROCEDURE — 1101F PR PT FALLS ASSESS DOC 0-1 FALLS W/OUT INJ PAST YR: ICD-10-PCS | Mod: CPTII,S$GLB,, | Performed by: STUDENT IN AN ORGANIZED HEALTH CARE EDUCATION/TRAINING PROGRAM

## 2023-05-04 PROCEDURE — 99397 PR PREVENTIVE VISIT,EST,65 & OVER: ICD-10-PCS | Mod: S$GLB,,, | Performed by: STUDENT IN AN ORGANIZED HEALTH CARE EDUCATION/TRAINING PROGRAM

## 2023-05-04 PROCEDURE — 1126F AMNT PAIN NOTED NONE PRSNT: CPT | Mod: CPTII,S$GLB,, | Performed by: STUDENT IN AN ORGANIZED HEALTH CARE EDUCATION/TRAINING PROGRAM

## 2023-05-04 NOTE — PROGRESS NOTES
Plan:     Rob was seen today for annual exam.    Diagnoses and all orders for this visit:    Preventative health care  Discussed age appropriate preventative healthcare items such as cancer screenings and recommended immunizations. Discussed whether patient is using tobacco, alcohol, or illicit drugs and any concerns were discussed. Discussed maintenance of a healthy weight. Patient queried if he has any additional questions about preventative healthcare and all questions were answered.     Tinnitus of both ears: F/u upcoming ENT appointment.     Bilateral sensorineural hearing loss: S/p Audiology eval, f/u upcoming fitting for hearing aids.    Actinic keratosis of scalp: Continue fluorouracil 5% cream PRN.    Onychomycosis due to dermatophyte: Continue compounded anti-fungal nail solution PRN.    Advised pt to notify clinic if he did not hear back regarding scheduling colonoscopy.     Follow up in 1 year (on 5/4/2024), or if symptoms worsen or fail to improve.    Ai Gómez MD  05/04/2023    Subjective:      Patient ID: Rob Hickman is a 73 y.o. male    Chief Complaint   Patient presents with    Annual Exam     HPI  73 y.o. male with a PMHx as documented below presents to clinic today for the following:    Recent labs ordered by Hilary Franz NP at annual medicare visit: CMP, lipid panel, TSH, hep C. All within acceptable limits.     Reviewed most recent test results - all questions answered, pt expressed understanding.    No daily medications other than topical treatments PRN for onychomycosis and actinic keratosis of scalp.     Last colonoscopy 2009 - case request placed at last visit, awaiting scheduling.    Pt reports recent visit w/ Audiology - needs hearing aids. Working on getting scheduled w/ ENT and then getting hearing aids.     ROS  Constitutional:  Negative for chills and fever.   Respiratory:  Negative for shortness of breath.    Cardiovascular:  Negative for chest pain.  "  Gastrointestinal:  Negative for abdominal pain, constipation, diarrhea, nausea and vomiting.     Current Outpatient Medications   Medication Instructions    fluorouraciL (EFUDEX) 5 % cream AAA scalp bid x 2 weeks, stop if severe irritation develops    triamcinolone acetonide 0.1% (KENALOG) 0.1 % cream AAA bid    UNABLE TO FIND CLEAN NAILS, SHAKE BOTTLE WELL, APPLY TWICE DAILY TO ALL AFFECTED NAILS USING APPLICATOR. (UP TO 2 MLS/DAY)      Past Medical History:   Diagnosis Date    Basal cell carcinoma     L arm approx 2010    Bilateral sensorineural hearing loss 5/4/2023    Graves disease     previously followed w/ Endo; + Ab and high uptake on scan; remission after Tx with tapazole    Nuclear sclerosis, bilateral 03/11/2021    Nuclear sclerotic cataract of right eye 04/19/2021    Onychocryptosis 06/16/2020    Onychomycosis due to dermatophyte 06/16/2020    Pyogenic granuloma of skin 06/16/2020    Thyrotoxicosis without mention of goiter or other cause, without mention of thyrotoxic crisis or storm 10/23/2011    Dx updated per 2019 IMO Load    Tinnitus of both ears 5/4/2023     Past Surgical History:   Procedure Laterality Date    CATARACT EXTRACTION W/  INTRAOCULAR LENS IMPLANT Left 3/11/2021    Procedure: EXTRACTION, CATARACT, WITH IOL INSERTION;  Surgeon: Nayan Obrien MD;  Location: [facility] OR;  Service: Ophthalmology;  Laterality: Left;    CATARACT EXTRACTION W/  INTRAOCULAR LENS IMPLANT Right 4/19/2021    Procedure: EXTRACTION, CATARACT, WITH IOL INSERTION;  Surgeon: Nayan Obrien MD;  Location: [facility] OR;  Service: Ophthalmology;  Laterality: Right;    TONSILLECTOMY      WISDOM TOOTH EXTRACTION       Review of patient's allergies indicates:  No Known Allergies  Family History   Problem Relation Age of Onset    Heart disease Mother     Blindness Maternal Grandmother     Cancer Maternal Grandfather         unknown    Cancer Paternal Grandfather         "all over"    Amblyopia Neg Hx     Cataracts Neg Hx     " "Glaucoma Neg Hx     Diabetes Neg Hx     Macular degeneration Neg Hx     Retinal detachment Neg Hx     Strabismus Neg Hx      Social History     Tobacco Use    Smoking status: Never    Smokeless tobacco: Never   Substance Use Topics    Alcohol use: No     Comment: rarely    Drug use: No     Currently on File with [facility]:   Most Recent Immunizations   Administered Date(s) Administered    Influenza - High Dose - PF (65 years and older) 11/01/2018    Influenza Split 01/17/2014    Tdap 08/19/2016       Objective:      Vitals:    05/04/23 0913   BP: 132/78   BP Location: Left arm   Patient Position: Sitting   Resp: 18   Weight: 92.5 kg (203 lb 14.8 oz)   Height: 5' 11" (1.803 m)     Body mass index is 28.44 kg/m².    Physical Exam   Constitutional:       General: No acute distress.  HENT:      Head: Normocephalic and atraumatic.   Pulmonary:      Effort: Pulmonary effort is normal. No respiratory distress.   Neurological:      General: No focal deficit present.      Mental Status: Alert and oriented to person, place, and time. Mental status is at baseline.    Assessment:       1. Preventative health care    2. Tinnitus of both ears    3. Bilateral sensorineural hearing loss    4. Actinic keratosis of scalp    5. Onychomycosis due to dermatophyte        Ai Gómez MD  [facility]  Office: [phone]   Fax: [phone]  05/04/2023      Disclaimer: This note was partly generated using dictation software which may occasionally result in transcription errors.    Total time spent on this encounter includes face to face time and non-face to face time preparing to see the patient (eg, review of tests), obtaining and/or reviewing separately obtained history, documenting clinical information in the electronic or other health record, independently interpreting results, and communicating results to the patient/family/caregiver, or care coordinator.    "

## 2023-05-11 ENCOUNTER — TELEPHONE (OUTPATIENT)
Dept: GASTROENTEROLOGY | Facility: CLINIC | Age: 73
End: 2023-05-11
Payer: MEDICARE

## 2023-05-11 NOTE — TELEPHONE ENCOUNTER
Spoke to pt in regards to scheduling colonoscopy. Pt states he is driving and it is not a good time, he will call back to schedule at a alter time.

## 2023-05-29 ENCOUNTER — OFFICE VISIT (OUTPATIENT)
Dept: OTOLARYNGOLOGY | Facility: CLINIC | Age: 73
End: 2023-05-29
Payer: MEDICARE

## 2023-05-29 ENCOUNTER — LAB VISIT (OUTPATIENT)
Dept: LAB | Facility: HOSPITAL | Age: 73
End: 2023-05-29
Attending: NURSE PRACTITIONER
Payer: MEDICARE

## 2023-05-29 VITALS — TEMPERATURE: 99 F | HEIGHT: 71 IN | WEIGHT: 202.81 LBS | BODY MASS INDEX: 28.39 KG/M2

## 2023-05-29 DIAGNOSIS — Z01.812 PRE-PROCEDURE LAB EXAM: ICD-10-CM

## 2023-05-29 DIAGNOSIS — H93.13 TINNITUS, BILATERAL: Primary | ICD-10-CM

## 2023-05-29 DIAGNOSIS — H90.A22 SENSORINEURAL HEARING LOSS (SNHL) OF LEFT EAR WITH RESTRICTED HEARING OF RIGHT EAR: ICD-10-CM

## 2023-05-29 LAB
CREAT SERPL-MCNC: 1.1 MG/DL (ref 0.5–1.4)
EST. GFR  (NO RACE VARIABLE): >60 ML/MIN/1.73 M^2

## 2023-05-29 PROCEDURE — 3008F BODY MASS INDEX DOCD: CPT | Mod: CPTII,S$GLB,, | Performed by: NURSE PRACTITIONER

## 2023-05-29 PROCEDURE — 82565 ASSAY OF CREATININE: CPT | Performed by: NURSE PRACTITIONER

## 2023-05-29 PROCEDURE — 1126F PR PAIN SEVERITY QUANTIFIED, NO PAIN PRESENT: ICD-10-PCS | Mod: CPTII,S$GLB,, | Performed by: NURSE PRACTITIONER

## 2023-05-29 PROCEDURE — 3008F PR BODY MASS INDEX (BMI) DOCUMENTED: ICD-10-PCS | Mod: CPTII,S$GLB,, | Performed by: NURSE PRACTITIONER

## 2023-05-29 PROCEDURE — 3288F FALL RISK ASSESSMENT DOCD: CPT | Mod: CPTII,S$GLB,, | Performed by: NURSE PRACTITIONER

## 2023-05-29 PROCEDURE — 99999 PR PBB SHADOW E&M-EST. PATIENT-LVL III: ICD-10-PCS | Mod: PBBFAC,,, | Performed by: NURSE PRACTITIONER

## 2023-05-29 PROCEDURE — 1160F RVW MEDS BY RX/DR IN RCRD: CPT | Mod: CPTII,S$GLB,, | Performed by: NURSE PRACTITIONER

## 2023-05-29 PROCEDURE — 99203 PR OFFICE/OUTPT VISIT, NEW, LEVL III, 30-44 MIN: ICD-10-PCS | Mod: S$GLB,,, | Performed by: NURSE PRACTITIONER

## 2023-05-29 PROCEDURE — 1101F PT FALLS ASSESS-DOCD LE1/YR: CPT | Mod: CPTII,S$GLB,, | Performed by: NURSE PRACTITIONER

## 2023-05-29 PROCEDURE — 99203 OFFICE O/P NEW LOW 30 MIN: CPT | Mod: S$GLB,,, | Performed by: NURSE PRACTITIONER

## 2023-05-29 PROCEDURE — 1159F MED LIST DOCD IN RCRD: CPT | Mod: CPTII,S$GLB,, | Performed by: NURSE PRACTITIONER

## 2023-05-29 PROCEDURE — 36415 COLL VENOUS BLD VENIPUNCTURE: CPT | Mod: PO | Performed by: NURSE PRACTITIONER

## 2023-05-29 PROCEDURE — 1159F PR MEDICATION LIST DOCUMENTED IN MEDICAL RECORD: ICD-10-PCS | Mod: CPTII,S$GLB,, | Performed by: NURSE PRACTITIONER

## 2023-05-29 PROCEDURE — 1101F PR PT FALLS ASSESS DOC 0-1 FALLS W/OUT INJ PAST YR: ICD-10-PCS | Mod: CPTII,S$GLB,, | Performed by: NURSE PRACTITIONER

## 2023-05-29 PROCEDURE — 3288F PR FALLS RISK ASSESSMENT DOCUMENTED: ICD-10-PCS | Mod: CPTII,S$GLB,, | Performed by: NURSE PRACTITIONER

## 2023-05-29 PROCEDURE — 1126F AMNT PAIN NOTED NONE PRSNT: CPT | Mod: CPTII,S$GLB,, | Performed by: NURSE PRACTITIONER

## 2023-05-29 PROCEDURE — 1160F PR REVIEW ALL MEDS BY PRESCRIBER/CLIN PHARMACIST DOCUMENTED: ICD-10-PCS | Mod: CPTII,S$GLB,, | Performed by: NURSE PRACTITIONER

## 2023-05-29 PROCEDURE — 99999 PR PBB SHADOW E&M-EST. PATIENT-LVL III: CPT | Mod: PBBFAC,,, | Performed by: NURSE PRACTITIONER

## 2023-05-29 NOTE — PROGRESS NOTES
Subjective     Patient ID: Rob Hickman is a 73 y.o. male.    Chief Complaint: No chief complaint on file.    HPI  Patient is new to ENT, referred to Audiology by ANABEL Franz. Audiogram was done on 04/03/2023 found to have 3-8 kHz asymmetry of 15-40 dB AS>AD with symmetric speech discrim. Tinnitus AS>AD. Patient reports gradually worsening hearing over several years. Worked in oil refineries. No family h/o HL. No otologic hx.     Review of Systems   Constitutional: Negative.    HENT:  Positive for hearing loss and tinnitus.    Eyes: Negative.    Respiratory: Negative.     Cardiovascular: Negative.    Gastrointestinal: Negative.    Musculoskeletal: Negative.    Integumentary:  Negative.   Neurological: Negative.    Hematological: Negative.    Psychiatric/Behavioral: Negative.          Objective     Physical Exam  Vitals and nursing note reviewed.   Constitutional:       General: He is not in acute distress.     Appearance: He is well-developed. He is not ill-appearing.   HENT:      Head: Normocephalic and atraumatic.      Right Ear: Hearing, tympanic membrane, ear canal and external ear normal. No middle ear effusion. Tympanic membrane is not erythematous.      Left Ear: Hearing, tympanic membrane, ear canal and external ear normal.  No middle ear effusion. Tympanic membrane is not erythematous.      Nose: Nose normal.   Eyes:      General: Lids are normal. No scleral icterus.        Right eye: No discharge.         Left eye: No discharge.   Neck:      Trachea: Trachea normal. No tracheal deviation.   Cardiovascular:      Rate and Rhythm: Normal rate.   Pulmonary:      Effort: Pulmonary effort is normal. No respiratory distress.      Breath sounds: No stridor. No wheezing.   Musculoskeletal:         General: Normal range of motion.      Cervical back: Normal range of motion.   Skin:     General: Skin is warm and dry.   Neurological:      Mental Status: He is alert and oriented to person, place, and time.       Coordination: Coordination is intact.      Gait: Gait normal.   Psychiatric:         Attention and Perception: Attention normal.         Mood and Affect: Mood normal.         Speech: Speech normal.         Behavior: Behavior normal. Behavior is cooperative.        Assessment and Plan   1. Tinnitus, bilateral        2. Sensorineural hearing loss (SNHL) of left ear with restricted hearing of right ear  MRI IAC/Temporal Bones W W/O Contrast      3. Pre-procedure lab exam  Creatinine, Serum           MRI brain with gadolinium per IAC protocol for asymmetrical sensorineural hearing loss to rule out retrocochlear pathology: acoustic schwannoma or cerebellar pontine angle mass. We will call patient with results as soon as available.  PATIENT IS MEDICALLY CLEARED FOR HEARING AIDS. The patient's hearing loss is not due to temporarily, correctable physical condition. There are no contraindications to hearing aid candidacy. Patient's audiogram reveals the patient is a candidate for amplification. Audiogram is reviewed in detail with the patient. The audiologist's recommendation that the patient have amplification/hearing aids is discussed and questions answered. Patient has been given information by the audiologist on how to schedule a hearing aid consultation. Patient is encouraged to wear ear protection in loud noise and return annually for hearing test. Return to clinic as needed for further ENT concerns.    No follow-ups on file.

## 2023-05-30 ENCOUNTER — TELEPHONE (OUTPATIENT)
Dept: GASTROENTEROLOGY | Facility: CLINIC | Age: 73
End: 2023-05-30
Payer: MEDICARE

## 2023-06-13 ENCOUNTER — TELEPHONE (OUTPATIENT)
Dept: OTOLARYNGOLOGY | Facility: CLINIC | Age: 73
End: 2023-06-13
Payer: MEDICARE

## 2023-06-13 ENCOUNTER — HOSPITAL ENCOUNTER (OUTPATIENT)
Dept: RADIOLOGY | Facility: HOSPITAL | Age: 73
Discharge: HOME | End: 2023-06-13
Attending: NURSE PRACTITIONER
Payer: MEDICARE

## 2023-06-13 DIAGNOSIS — H90.A22 SENSORINEURAL HEARING LOSS (SNHL) OF LEFT EAR WITH RESTRICTED HEARING OF RIGHT EAR: ICD-10-CM

## 2023-06-13 PROCEDURE — 70553 MRI IAC/TEMPORAL BONES W W/O CONTRAST: ICD-10-PCS | Mod: 26,,, | Performed by: RADIOLOGY

## 2023-06-13 PROCEDURE — 25500020 PHARM REV CODE 255: Mod: PO | Performed by: NURSE PRACTITIONER

## 2023-06-13 PROCEDURE — 70553 MRI BRAIN STEM W/O & W/DYE: CPT | Mod: TC,PO

## 2023-06-13 PROCEDURE — 70553 MRI BRAIN STEM W/O & W/DYE: CPT | Mod: 26,,, | Performed by: RADIOLOGY

## 2023-06-13 PROCEDURE — A9585 GADOBUTROL INJECTION: HCPCS | Mod: PO | Performed by: NURSE PRACTITIONER

## 2023-06-13 RX ORDER — GADOBUTROL 604.72 MG/ML
9 INJECTION INTRAVENOUS
Status: COMPLETED | OUTPATIENT
Start: 2023-06-13 | End: 2023-06-13

## 2023-06-13 RX ADMIN — GADOBUTROL 9 ML: 604.72 INJECTION INTRAVENOUS at 09:06

## 2023-06-13 NOTE — TELEPHONE ENCOUNTER
"Called pt back and read Magy's explaination to the pt, as well as her recommendations. Pt would like to know exactly what is meant by "Lifestyle changes to help reduce the risk of stroke, cognitive decline, and physical disabilities are encouraged." Pt states that he is active and closely watches what eats. Please contact pt with explanation, and additional questions he may have.  "

## 2023-06-13 NOTE — TELEPHONE ENCOUNTER
----- Message from Magy Cruz NP sent at 6/13/2023 10:55 AM CDT -----  Normal MRI -- nothing to explain why your left-sided hearing is worse. The next step is consideration of possible hearing aids.

## 2023-08-24 ENCOUNTER — ANESTHESIA EVENT (OUTPATIENT)
Dept: ENDOSCOPY | Facility: HOSPITAL | Age: 73
End: 2023-08-24
Payer: MEDICARE

## 2023-08-24 ENCOUNTER — HOSPITAL ENCOUNTER (OUTPATIENT)
Facility: HOSPITAL | Age: 73
Discharge: HOME | End: 2023-08-24
Attending: SURGERY | Admitting: SURGERY
Payer: MEDICARE

## 2023-08-24 ENCOUNTER — ANESTHESIA (OUTPATIENT)
Dept: ENDOSCOPY | Facility: HOSPITAL | Age: 73
End: 2023-08-24
Payer: MEDICARE

## 2023-08-24 VITALS
RESPIRATION RATE: 16 BRPM | HEIGHT: 71 IN | WEIGHT: 202 LBS | HEART RATE: 68 BPM | TEMPERATURE: 98 F | SYSTOLIC BLOOD PRESSURE: 131 MMHG | OXYGEN SATURATION: 99 % | BODY MASS INDEX: 28.28 KG/M2 | DIASTOLIC BLOOD PRESSURE: 74 MMHG

## 2023-08-24 DIAGNOSIS — Z86.010 HISTORY OF COLON POLYPS: Primary | ICD-10-CM

## 2023-08-24 PROCEDURE — D9220A PRA ANESTHESIA: Mod: ANES,,, | Performed by: ANESTHESIOLOGY

## 2023-08-24 PROCEDURE — G0121 COLON CA SCRN NOT HI RSK IND: HCPCS | Mod: PO | Performed by: SURGERY

## 2023-08-24 PROCEDURE — 63600175 PHARM REV CODE 636 W HCPCS: Mod: PO | Performed by: NURSE ANESTHETIST, CERTIFIED REGISTERED

## 2023-08-24 PROCEDURE — 37000009 HC ANESTHESIA EA ADD 15 MINS: Mod: PO | Performed by: SURGERY

## 2023-08-24 PROCEDURE — G0121 COLON CA SCRN NOT HI RSK IND: ICD-10-PCS | Mod: ,,, | Performed by: SURGERY

## 2023-08-24 PROCEDURE — D9220A PRA ANESTHESIA: ICD-10-PCS | Mod: CRNA,,, | Performed by: NURSE ANESTHETIST, CERTIFIED REGISTERED

## 2023-08-24 PROCEDURE — G0121 COLON CA SCRN NOT HI RSK IND: HCPCS | Mod: ,,, | Performed by: SURGERY

## 2023-08-24 PROCEDURE — 25000003 PHARM REV CODE 250: Mod: PO | Performed by: NURSE ANESTHETIST, CERTIFIED REGISTERED

## 2023-08-24 PROCEDURE — D9220A PRA ANESTHESIA: ICD-10-PCS | Mod: ANES,,, | Performed by: ANESTHESIOLOGY

## 2023-08-24 PROCEDURE — 37000008 HC ANESTHESIA 1ST 15 MINUTES: Mod: PO | Performed by: SURGERY

## 2023-08-24 PROCEDURE — D9220A PRA ANESTHESIA: Mod: CRNA,,, | Performed by: NURSE ANESTHETIST, CERTIFIED REGISTERED

## 2023-08-24 RX ORDER — LIDOCAINE HYDROCHLORIDE 20 MG/ML
INJECTION INTRAVENOUS
Status: DISCONTINUED | OUTPATIENT
Start: 2023-08-24 | End: 2023-08-24

## 2023-08-24 RX ORDER — PROPOFOL 10 MG/ML
INJECTION, EMULSION INTRAVENOUS
Status: DISCONTINUED | OUTPATIENT
Start: 2023-08-24 | End: 2023-08-24

## 2023-08-24 RX ADMIN — PROPOFOL 25 MG: 10 INJECTION, EMULSION INTRAVENOUS at 08:08

## 2023-08-24 RX ADMIN — PROPOFOL 50 MG: 10 INJECTION, EMULSION INTRAVENOUS at 08:08

## 2023-08-24 RX ADMIN — SODIUM CHLORIDE, SODIUM LACTATE, POTASSIUM CHLORIDE, AND CALCIUM CHLORIDE: .6; .31; .03; .02 INJECTION, SOLUTION INTRAVENOUS at 08:08

## 2023-08-24 RX ADMIN — LIDOCAINE HYDROCHLORIDE 100 MG: 20 INJECTION INTRAVENOUS at 08:08

## 2023-08-24 RX ADMIN — PROPOFOL 75 MG: 10 INJECTION, EMULSION INTRAVENOUS at 08:08

## 2023-08-24 NOTE — ANESTHESIA PREPROCEDURE EVALUATION
08/24/2023  Rob Hickman is a 73 y.o., male.      Pre-op Assessment    I have reviewed the Patient Summary Reports.     I have reviewed the Nursing Notes. I have reviewed the NPO Status.   I have reviewed the Medications.     Review of Systems  Anesthesia Hx:  Denies Family Hx of Anesthesia complications.   Denies Personal Hx of Anesthesia complications.   EENT/Dental:   Tinnitus, hearing loss AU   Hepatic/GI:   Bowel Prep.        Physical Exam  General: Well nourished, Cooperative, Alert and Oriented    Airway:  Mallampati: II / I  Mouth Opening: Normal  TM Distance: Normal  Tongue: Normal        Anesthesia Plan  Type of Anesthesia, risks & benefits discussed:    Anesthesia Type: Gen Natural Airway  Intra-op Monitoring Plan: Standard ASA Monitors  Induction:  IV  Informed Consent: Informed consent signed with the Patient and all parties understand the risks and agree with anesthesia plan.  All questions answered.   ASA Score: 2    Ready For Surgery From Anesthesia Perspective.     .

## 2023-08-24 NOTE — TRANSFER OF CARE
"Anesthesia Transfer of Care Note    Patient: Rob Hickman    Procedure(s) Performed: Procedure(s) (LRB):  COLONOSCOPY (N/A)    Patient location: PACU    Anesthesia Type: general    Transport from OR: Transported from OR on 6-10 L/min O2 by face mask with adequate spontaneous ventilation    Post pain: adequate analgesia    Post assessment: no apparent anesthetic complications and tolerated procedure well    Post vital signs: stable    Level of consciousness: awake and responds to stimulation    Nausea/Vomiting: no nausea/vomiting    Complications: none    Transfer of care protocol was followed      Last vitals:   Visit Vitals  BP (!) 143/79   Pulse 71   Temp 36.5 °C (97.7 °F) (Temporal)   Resp 16   Ht 5' 11" (1.803 m)   Wt 91.6 kg (202 lb)   SpO2 98%   BMI 28.17 kg/m²     "

## 2023-08-24 NOTE — H&P
[facility] - Endoscopy  History & Physical     Subjective:     Chief Complaint/Reason for Admission: history of colon polyps    History of Present Illness:   Patient 73 y.o. male presents  for colonoscopy.  Pt nots he has history of colon polyps.  Denies abd pain or changes in bowel habits.  He has no significant PMhx or PShx.   .    Patient Active Problem List    Diagnosis Date Noted    Tinnitus of both ears 05/04/2023    Bilateral sensorineural hearing loss 05/04/2023    Actinic keratosis of scalp 05/04/2023    Onychomycosis due to dermatophyte 06/16/2020        Medications Prior to Admission   Medication Sig Dispense Refill Last Dose    fluorouraciL (EFUDEX) 5 % cream AAA scalp bid x 2 weeks, stop if severe irritation develops 40 g 1 Past Month    triamcinolone acetonide 0.1% (KENALOG) 0.1 % cream AAA bid 60 g 1 Past Month    UNABLE TO FIND CLEAN NAILS, SHAKE BOTTLE WELL, APPLY TWICE DAILY TO ALL AFFECTED NAILS USING APPLICATOR. (UP TO 2 MLS/DAY)        Review of patient's allergies indicates:  No Known Allergies     Past Medical History:   Diagnosis Date    Basal cell carcinoma     L arm approx 2010    Bilateral sensorineural hearing loss 5/4/2023    Graves disease     previously followed w/ Endo; + Ab and high uptake on scan; remission after Tx with tapazole    Nuclear sclerosis, bilateral 03/11/2021    Nuclear sclerotic cataract of right eye 04/19/2021    Onychocryptosis 06/16/2020    Onychomycosis due to dermatophyte 06/16/2020    Pyogenic granuloma of skin 06/16/2020    Thyrotoxicosis without mention of goiter or other cause, without mention of thyrotoxic crisis or storm 10/23/2011    Dx updated per 2019 IMO Load    Tinnitus of both ears 5/4/2023      Past Surgical History:   Procedure Laterality Date    CATARACT EXTRACTION W/  INTRAOCULAR LENS IMPLANT Left 03/11/2021    Procedure: EXTRACTION, CATARACT, WITH IOL INSERTION;  Surgeon: Nayan Obrien MD;  [facility];  Service: Ophthalmology;  Laterality:  "Left;    CATARACT EXTRACTION W/  INTRAOCULAR LENS IMPLANT Right 04/19/2021    Procedure: EXTRACTION, CATARACT, WITH IOL INSERTION;  Surgeon: Nayan Obrien MD;  Location: [facility];  Service: Ophthalmology;  Laterality: Right;    COLONOSCOPY      x 3; most recent approx 15 years ago as of 8/21/23    TONSILLECTOMY      WISDOM TOOTH EXTRACTION        Family History   Problem Relation Age of Onset    Heart disease Mother     Blindness Maternal Grandmother     Cancer Maternal Grandfather         unknown    Cancer Paternal Grandfather         "all over"    Amblyopia Neg Hx     Cataracts Neg Hx     Glaucoma Neg Hx     Diabetes Neg Hx     Macular degeneration Neg Hx     Retinal detachment Neg Hx     Strabismus Neg Hx       Social History     Tobacco Use    Smoking status: Never    Smokeless tobacco: Never   Substance Use Topics    Alcohol use: No     Comment: rarely        Review of Systems:  A comprehensive review of systems was negative.    OBJECTIVE:     Patient Vitals for the past 8 hrs:   BP Temp Temp src Pulse Resp SpO2   08/24/23 0707 (!) 143/79 97.7 °F (36.5 °C) Temporal 71 16 98 %     AAOx3  Soft/nt/nd  No resp distress      Data Review:      ASSESSMENT/PLAN:     There are no hospital problems to display for this patient.  Surveillance cscope     Plan:  To have cscope today    "

## 2023-08-24 NOTE — ANESTHESIA POSTPROCEDURE EVALUATION
Anesthesia Post Evaluation    Patient: Rob Hickman    Procedure(s) Performed: Procedure(s) (LRB):  COLONOSCOPY (N/A)    Final Anesthesia Type: general      Patient location during evaluation: PACU  Patient participation: Yes- Able to Participate  Level of consciousness: awake and alert  Post-procedure vital signs: reviewed and stable  Pain management: adequate  Airway patency: patent    PONV status at discharge: No PONV  Anesthetic complications: no      Cardiovascular status: blood pressure returned to baseline  Respiratory status: unassisted  Hydration status: euvolemic  Follow-up not needed.          Vitals Value Taken Time   /74 08/24/23 0858   Temp 36.6 °C (97.9 °F) 08/24/23 0828   Pulse 68 08/24/23 0858   Resp 16 08/24/23 0858   SpO2 99 % 08/24/23 0858         Event Time   Out of Recovery 09:00:00         Pain/Gurpreet Score: Gurpreet Score: 10 (8/24/2023  8:41 AM)

## 2023-08-24 NOTE — PROVATION PATIENT INSTRUCTIONS
Discharge Summary/Instructions after an Endoscopic Procedure  Patient Name: Rob Hickman  Patient MRN: [id number]  Patient YOB: 1950  Thursday, August 24, 2023  Dominic Wu MD  Dear patient,  As a result of recent federal legislation (The Federal Cures Act), you may   receive lab or pathology results from your procedure in your [facility]   account before your physician is able to contact you. Your physician or   their representative will relay the results to you with their   recommendations at their soonest availability.  Thank you,  RESTRICTIONS:  During your procedure today, you received medications for sedation.  These   medications may affect your judgment, balance and coordination.  Therefore,   for 24 hours, you have the following restrictions:   - DO NOT drive a car, operate machinery, make legal/financial decisions,   sign important papers or drink alcohol.    ACTIVITY:  Today: no heavy lifting, straining or running due to procedural   sedation/anesthesia.  The following day: return to full activity including work.  DIET:  Eat and drink normally unless instructed otherwise.     TREATMENT FOR COMMON SIDE EFFECTS:  - Mild abdominal pain, nausea, belching, bloating or excessive gas:  rest,   eat lightly and use a heating pad.  - Sore Throat: treat with throat lozenges and/or gargle with warm salt   water.  - Because air was used during the procedure, expelling large amounts of air   from your rectum or belching is normal.  - If a bowel prep was taken, you may not have a bowel movement for 1-3 days.    This is normal.  SYMPTOMS TO WATCH FOR AND REPORT TO YOUR PHYSICIAN:  1. Abdominal pain or bloating, other than gas cramps.  2. Chest pain.  3. Back pain.  4. Signs of infection such as: chills or fever occurring within 24 hours   after the procedure.  5. Rectal bleeding, which would show as bright red, maroon, or black stools.   (A tablespoon of blood from the rectum is not serious, especially  if   hemorrhoids are present.)  6. Vomiting.  7. Weakness or dizziness.  GO DIRECTLY TO THE NEAREST EMERGENCY ROOM IF YOU HAVE ANY OF THE FOLLOWING:      Difficulty breathing              Chills and/or fever over 101 F   Persistent vomiting and/or vomiting blood   Severe abdominal pain   Severe chest pain   Black, tarry stools   Bleeding- more than one tablespoon   Any other symptom or condition that you feel may need urgent attention  Your doctor recommends these additional instructions:  If any biopsies were taken, your doctors clinic will contact you in 1 to 2   weeks with any results.  You are being discharged to home.   Resume your regular diet.   Continue your present medications.   Your physician has indicated that a repeat colonoscopy is not recommended   due to your current age (66 years or older) for screening purposes.   Return to your GI office as needed.  For questions, problems or results please call your physician - Dominic Wu MD at Work:  [phone].  EMERGENCY PHONE NUMBER: [phone], LAB RESULTS: [phone]  IF A COMPLICATION OR EMERGENCY SITUATION ARISES AND YOU ARE UNABLE TO REACH   YOUR PHYSICIAN - GO DIRECTLY TO THE EMERGENCY ROOM.  ___________________________________________  Nurse Signature  ___________________________________________  Patient/Designated Responsible Party Signature  Dominic Wu MD  8/24/2023 8:29:13 AM  This report has been verified and signed electronically.  Dear patient,  As a result of recent federal legislation (The Federal Cures Act), you may   receive lab or pathology results from your procedure in your [facility]   account before your physician is able to contact you. Your physician or   their representative will relay the results to you with their   recommendations at their soonest availability.  Thank you.  PROVATION

## 2024-02-16 ENCOUNTER — OFFICE VISIT (OUTPATIENT)
Dept: ORTHOPEDICS | Facility: CLINIC | Age: 74
End: 2024-02-16
Payer: MEDICARE

## 2024-02-16 ENCOUNTER — HOSPITAL ENCOUNTER (OUTPATIENT)
Dept: RADIOLOGY | Facility: HOSPITAL | Age: 74
Discharge: HOME | End: 2024-02-16
Attending: NURSE PRACTITIONER
Payer: MEDICARE

## 2024-02-16 ENCOUNTER — OFFICE VISIT (OUTPATIENT)
Dept: FAMILY MEDICINE | Facility: CLINIC | Age: 74
End: 2024-02-16
Payer: MEDICARE

## 2024-02-16 VITALS
BODY MASS INDEX: 29.08 KG/M2 | SYSTOLIC BLOOD PRESSURE: 136 MMHG | HEIGHT: 71 IN | WEIGHT: 207.69 LBS | DIASTOLIC BLOOD PRESSURE: 74 MMHG | HEART RATE: 68 BPM

## 2024-02-16 VITALS — WEIGHT: 207 LBS | HEIGHT: 71 IN | BODY MASS INDEX: 28.98 KG/M2

## 2024-02-16 DIAGNOSIS — S67.10XA CRUSHING INJURY OF FINGER OF LEFT HAND: ICD-10-CM

## 2024-02-16 DIAGNOSIS — S67.10XA CRUSHING INJURY OF FINGER OF LEFT HAND: Primary | ICD-10-CM

## 2024-02-16 DIAGNOSIS — M79.645 FINGER PAIN, LEFT: ICD-10-CM

## 2024-02-16 PROCEDURE — 1101F PT FALLS ASSESS-DOCD LE1/YR: CPT | Mod: CPTII,S$GLB,, | Performed by: PHYSICIAN ASSISTANT

## 2024-02-16 PROCEDURE — 99213 OFFICE O/P EST LOW 20 MIN: CPT | Mod: S$GLB,,, | Performed by: NURSE PRACTITIONER

## 2024-02-16 PROCEDURE — 3078F DIAST BP <80 MM HG: CPT | Mod: CPTII,S$GLB,, | Performed by: NURSE PRACTITIONER

## 2024-02-16 PROCEDURE — 1159F MED LIST DOCD IN RCRD: CPT | Mod: CPTII,S$GLB,, | Performed by: PHYSICIAN ASSISTANT

## 2024-02-16 PROCEDURE — 1160F RVW MEDS BY RX/DR IN RCRD: CPT | Mod: CPTII,S$GLB,, | Performed by: PHYSICIAN ASSISTANT

## 2024-02-16 PROCEDURE — 1125F AMNT PAIN NOTED PAIN PRSNT: CPT | Mod: CPTII,S$GLB,, | Performed by: PHYSICIAN ASSISTANT

## 2024-02-16 PROCEDURE — 3075F SYST BP GE 130 - 139MM HG: CPT | Mod: CPTII,S$GLB,, | Performed by: NURSE PRACTITIONER

## 2024-02-16 PROCEDURE — 99999 PR PBB SHADOW E&M-EST. PATIENT-LVL III: CPT | Mod: PBBFAC,,, | Performed by: PHYSICIAN ASSISTANT

## 2024-02-16 PROCEDURE — 99203 OFFICE O/P NEW LOW 30 MIN: CPT | Mod: S$GLB,,, | Performed by: PHYSICIAN ASSISTANT

## 2024-02-16 PROCEDURE — 73140 X-RAY EXAM OF FINGER(S): CPT | Mod: TC,PN,LT

## 2024-02-16 PROCEDURE — 99999 PR PBB SHADOW E&M-EST. PATIENT-LVL III: CPT | Mod: PBBFAC,,, | Performed by: NURSE PRACTITIONER

## 2024-02-16 PROCEDURE — 3008F BODY MASS INDEX DOCD: CPT | Mod: CPTII,S$GLB,, | Performed by: NURSE PRACTITIONER

## 2024-02-16 PROCEDURE — 1101F PT FALLS ASSESS-DOCD LE1/YR: CPT | Mod: CPTII,S$GLB,, | Performed by: NURSE PRACTITIONER

## 2024-02-16 PROCEDURE — 3288F FALL RISK ASSESSMENT DOCD: CPT | Mod: CPTII,S$GLB,, | Performed by: NURSE PRACTITIONER

## 2024-02-16 PROCEDURE — 3008F BODY MASS INDEX DOCD: CPT | Mod: CPTII,S$GLB,, | Performed by: PHYSICIAN ASSISTANT

## 2024-02-16 PROCEDURE — 73140 X-RAY EXAM OF FINGER(S): CPT | Mod: 26,LT,, | Performed by: RADIOLOGY

## 2024-02-16 PROCEDURE — 3288F FALL RISK ASSESSMENT DOCD: CPT | Mod: CPTII,S$GLB,, | Performed by: PHYSICIAN ASSISTANT

## 2024-02-16 NOTE — PROGRESS NOTES
Assessment and Plan:  Rob was seen today for smashed finger.    Diagnoses and all orders for this visit:    Crushing injury of finger of left hand  -     X-Ray Finger 2 or More Views Left; Future  -     Ambulatory referral/consult to Hand Surgery; Future    Finger pain, left  -     X-Ray Finger 2 or More Views Left; Future  -     Ambulatory referral/consult to Hand Surgery; Future         Follow up in about 2 weeks (around 3/1/2024), or if symptoms worsen or fail to improve.   ______________________________________________________________________  Subjective:    Chief Complaint:  Smashed left index finger    HPI:  Rob is a 73 y.o. year old male here c/o pain and soft tissue injury to left index after hitting his finger with a hammer last Wednesday.  Patient reports he has been cleaning with hydrogen peroxide and keeping it moist with Vaseline. Patient reports in finger appears to be healing but he is concerned about disfigurement.  Taking ibuprofen as needed for pain with relief.  Patient denies fever, decreased sensation and exudate.      Medications:  Current Outpatient Medications on File Prior to Visit   Medication Sig Dispense Refill    triamcinolone acetonide 0.1% (KENALOG) 0.1 % cream AAA bid 60 g 1    fluorouraciL (EFUDEX) 5 % cream AAA scalp bid x 2 weeks, stop if severe irritation develops 40 g 1    UNABLE TO FIND CLEAN NAILS, SHAKE BOTTLE WELL, APPLY TWICE DAILY TO ALL AFFECTED NAILS USING APPLICATOR. (UP TO 2 MLS/DAY)      [DISCONTINUED] methimazole (TAPAZOLE) 5 MG Tab Take 5 mg by mouth once daily.       No current facility-administered medications on file prior to visit.       Review of Systems:  Review of Systems   Constitutional:  Negative for fatigue and fever.   Musculoskeletal:  Positive for arthralgias. Negative for joint swelling and myalgias.   Skin:  Positive for wound.   Psychiatric/Behavioral:  Negative for confusion.        Past Medical History:  Past Medical History:   Diagnosis Date  "   Basal cell carcinoma     L arm approx 2010    Bilateral sensorineural hearing loss 5/4/2023    Graves disease     previously followed w/ Endo; + Ab and high uptake on scan; remission after Tx with tapazole    Nuclear sclerosis, bilateral 03/11/2021    Nuclear sclerotic cataract of right eye 04/19/2021    Onychocryptosis 06/16/2020    Onychomycosis due to dermatophyte 06/16/2020    Pyogenic granuloma of skin 06/16/2020    Thyrotoxicosis without mention of goiter or other cause, without mention of thyrotoxic crisis or storm 10/23/2011    Dx updated per 2019 IMO Load    Tinnitus of both ears 5/4/2023       Objective:    Vitals:  Vitals:    02/16/24 0937   BP: 136/74   Pulse: 68   Weight: 94.2 kg (207 lb 10.8 oz)   Height: 5' 11" (1.803 m)       Physical Exam  HENT:      Head: Normocephalic and atraumatic.      Mouth/Throat:      Pharynx: Oropharynx is clear.   Cardiovascular:      Rate and Rhythm: Normal rate and regular rhythm.   Pulmonary:      Effort: Pulmonary effort is normal.      Breath sounds: Normal breath sounds.   Musculoskeletal:      Left hand: Tenderness (Tip of left index finger) present. No bony tenderness.      Comments: Crush injury noted to left index finger, see photo.   Skin:     General: Skin is warm and dry.   Neurological:      Mental Status: He is alert and oriented to person, place, and time.   Psychiatric:         Mood and Affect: Mood normal.         Thought Content: Thought content normal.                 Data:            Medical history reviewed, Medications reconciled.      Visit summary:    Mr. Hickman was seen today for a crush injury to left index finger.  X-ray shows no evidence of fracture or dislocation.  Referral placed to hand surgery for further evaluation.  Patient advised on wound care.  He will continue taking ibuprofen as needed for pain.  Emergent conditions discussed.  Patient to follow up in 2 weeks.  May return sooner for any concerns.        Phyllis Edwards, " FNP-C  [facility]

## 2024-02-16 NOTE — PROGRESS NOTES
2/16/2024    HPI:  Rob Hickman is a 73 y.o. male, who presents to clinic today for   Evaluation of his left index finger injury.  States approximately 9 days ago he struck the tip of his left index finger with a hammer.  States immediate pain, swelling, and bleeding to the tip of the finger.  States he has been cleaning with hydrogen peroxide and keeping it moist with Vaseline.  States he is worried about the healing of the finger, which prompted him to follow up with his primary care provider.  States x-rays were taken today which showed no fracture.  States  his primary care provider referred him to our clinic.  Denies any paresthesias.  Denies any other complaints this time.    PMHX:  Past Medical History:   Diagnosis Date    Basal cell carcinoma     L arm approx 2010    Bilateral sensorineural hearing loss 5/4/2023    Graves disease     previously followed w/ Endo; + Ab and high uptake on scan; remission after Tx with tapazole    Nuclear sclerosis, bilateral 03/11/2021    Nuclear sclerotic cataract of right eye 04/19/2021    Onychocryptosis 06/16/2020    Onychomycosis due to dermatophyte 06/16/2020    Pyogenic granuloma of skin 06/16/2020    Thyrotoxicosis without mention of goiter or other cause, without mention of thyrotoxic crisis or storm 10/23/2011    Dx updated per 2019 IMO Load    Tinnitus of both ears 5/4/2023       PSHX:  Past Surgical History:   Procedure Laterality Date    CATARACT EXTRACTION W/  INTRAOCULAR LENS IMPLANT Left 03/11/2021    Procedure: EXTRACTION, CATARACT, WITH IOL INSERTION;  Surgeon: Nayan Obrien MD;  Location: [facility] OR;  Service: Ophthalmology;  Laterality: Left;    CATARACT EXTRACTION W/  INTRAOCULAR LENS IMPLANT Right 04/19/2021    Procedure: EXTRACTION, CATARACT, WITH IOL INSERTION;  Surgeon: Nayan Obrien MD;  Location: [facility] OR;  Service: Ophthalmology;  Laterality: Right;    COLONOSCOPY      x 3; most recent approx 15 years ago as of 8/21/23    COLONOSCOPY N/A 8/24/2023  "   Procedure: COLONOSCOPY;  Surgeon: Dominic Wu MD;  Location: [facility];  Service: Endoscopy;  Laterality: N/A;    TONSILLECTOMY      WISDOM TOOTH EXTRACTION         FMHX:  Family History   Problem Relation Age of Onset    Heart disease Mother     Blindness Maternal Grandmother     Cancer Maternal Grandfather         unknown    Cancer Paternal Grandfather         "all over"    Amblyopia Neg Hx     Cataracts Neg Hx     Glaucoma Neg Hx     Diabetes Neg Hx     Macular degeneration Neg Hx     Retinal detachment Neg Hx     Strabismus Neg Hx        SOCHX:  Social History     Tobacco Use    Smoking status: Never    Smokeless tobacco: Never   Substance Use Topics    Alcohol use: No     Comment: rarely       ALLERGIES:  Patient has no known allergies.    CURRENT MEDICATIONS:  Current Outpatient Medications on File Prior to Visit   Medication Sig Dispense Refill    fluorouraciL (EFUDEX) 5 % cream AAA scalp bid x 2 weeks, stop if severe irritation develops 40 g 1    triamcinolone acetonide 0.1% (KENALOG) 0.1 % cream AAA bid 60 g 1    UNABLE TO FIND CLEAN NAILS, SHAKE BOTTLE WELL, APPLY TWICE DAILY TO ALL AFFECTED NAILS USING APPLICATOR. (UP TO 2 MLS/DAY)      [DISCONTINUED] methimazole (TAPAZOLE) 5 MG Tab Take 5 mg by mouth once daily.       No current facility-administered medications on file prior to visit.       REVIEW OF SYSTEMS:  Review of Systems Complete; Negative, unless noted above.    GENERAL PHYSICAL EXAM:   Ht 5' 11" (1.803 m)   Wt 93.9 kg (207 lb)   BMI 28.87 kg/m²    GEN: well developed, well nourished, no acute distress   PULM: No wheezing, no respiratory distress   CV: RRR    ORTHO EXAM:     Examination of the left index finger reveals mild edema and mild ecchymosis of the left index fingertip.  Presence of hypergranulation of the ulnar portion of the wound with some areas of the wound that are still healing appropriately. Able to flex extend the finger appropriately.  Strength not tested secondary to " wound.    Sensation is grossly intact of the left index finger.  Capillary refill less than 2 seconds.    RADIOLOGY:     X-rays of the left index finger were taken today in clinic.  X-rays reviewed by myself.  Imaging showed no acute fracture or dislocation no subluxation.  No radiopaque foreign body or mass noted.  No osseous destructive/ erosive processes noted.  Presence of soft tissue swelling of the left index fingertip with evidence of hypergranulation noted on physical examination.  No other significant bony abnormalities noted.    ASSESSMENT:     Left index fingertip crush injury    PLAN:  1. I discussed with Rob Hickman  the fingertip crush injury pathology and treatment options in detail during today's visit.  After treatment options were discussed, we decided the best course of action at this time is to proceed with daily dressing changes using clean running water and antibacterial soap.  We did discuss the importance of discontinuing the use of both the Vaseline and hydrogen peroxide.  We discussed the importance of keeping the wound is dry as possible.  He verbalized understanding and verbally agreed with the treatment plan.    2.   His left index fingertip wound was thoroughly cleaned in clinic today with chlorhexidine.  His wound was then dressed with 4 x 4 gauze, Vira wrap, and Coban wrap.  He was instructed to always have a soft bulky dressing placed on the finger.  He verbalized understanding.      3.  I would like him follow-up in clinic in 5 days for repeat wound check.  He was instructed to contact the clinic for any problems or concerns in the interim.

## 2024-02-21 ENCOUNTER — OFFICE VISIT (OUTPATIENT)
Dept: ORTHOPEDICS | Facility: CLINIC | Age: 74
End: 2024-02-21
Payer: MEDICARE

## 2024-02-21 VITALS — HEIGHT: 71 IN | BODY MASS INDEX: 28.98 KG/M2 | WEIGHT: 207 LBS

## 2024-02-21 DIAGNOSIS — S67.10XA CRUSHING INJURY OF FINGER OF LEFT HAND: Primary | ICD-10-CM

## 2024-02-21 PROCEDURE — 1160F RVW MEDS BY RX/DR IN RCRD: CPT | Mod: CPTII,S$GLB,, | Performed by: PHYSICIAN ASSISTANT

## 2024-02-21 PROCEDURE — 3008F BODY MASS INDEX DOCD: CPT | Mod: CPTII,S$GLB,, | Performed by: PHYSICIAN ASSISTANT

## 2024-02-21 PROCEDURE — 3288F FALL RISK ASSESSMENT DOCD: CPT | Mod: CPTII,S$GLB,, | Performed by: PHYSICIAN ASSISTANT

## 2024-02-21 PROCEDURE — 1101F PT FALLS ASSESS-DOCD LE1/YR: CPT | Mod: CPTII,S$GLB,, | Performed by: PHYSICIAN ASSISTANT

## 2024-02-21 PROCEDURE — 1159F MED LIST DOCD IN RCRD: CPT | Mod: CPTII,S$GLB,, | Performed by: PHYSICIAN ASSISTANT

## 2024-02-21 PROCEDURE — 99213 OFFICE O/P EST LOW 20 MIN: CPT | Mod: S$GLB,,, | Performed by: PHYSICIAN ASSISTANT

## 2024-02-21 PROCEDURE — 1125F AMNT PAIN NOTED PAIN PRSNT: CPT | Mod: CPTII,S$GLB,, | Performed by: PHYSICIAN ASSISTANT

## 2024-02-21 PROCEDURE — 99999 PR PBB SHADOW E&M-EST. PATIENT-LVL III: CPT | Mod: PBBFAC,,, | Performed by: PHYSICIAN ASSISTANT

## 2024-02-21 NOTE — PROGRESS NOTES
2/21/2024    HPI:  Rob Hickman is a 73 y.o. male, who presents to clinic today for continued evaluation of his left index finger crushing injury.  He is approximately 2 weeks status post injury.  States overall the wound and pain of the finger has significantly improved.  States pain on average is 0/10.  States he is kept the finger dry, clean, and dressed as instructed.  Denies any acute injuries since last visit.  Denies any paresthesias.  Denies any other complaints at this time.    PMHX:  Past Medical History:   Diagnosis Date    Basal cell carcinoma     L arm approx 2010    Bilateral sensorineural hearing loss 5/4/2023    Graves disease     previously followed w/ Endo; + Ab and high uptake on scan; remission after Tx with tapazole    Nuclear sclerosis, bilateral 03/11/2021    Nuclear sclerotic cataract of right eye 04/19/2021    Onychocryptosis 06/16/2020    Onychomycosis due to dermatophyte 06/16/2020    Pyogenic granuloma of skin 06/16/2020    Thyrotoxicosis without mention of goiter or other cause, without mention of thyrotoxic crisis or storm 10/23/2011    Dx updated per 2019 IMO Load    Tinnitus of both ears 5/4/2023       PSHX:  Past Surgical History:   Procedure Laterality Date    CATARACT EXTRACTION W/  INTRAOCULAR LENS IMPLANT Left 03/11/2021    Procedure: EXTRACTION, CATARACT, WITH IOL INSERTION;  Surgeon: Nayan Obrien MD;  Location: [facility];  Service: Ophthalmology;  Laterality: Left;    CATARACT EXTRACTION W/  INTRAOCULAR LENS IMPLANT Right 04/19/2021    Procedure: EXTRACTION, CATARACT, WITH IOL INSERTION;  Surgeon: Nayan Obrien MD;  Location: [facility];  Service: Ophthalmology;  Laterality: Right;    COLONOSCOPY      x 3; most recent approx 15 years ago as of 8/21/23    COLONOSCOPY N/A 8/24/2023    Procedure: COLONOSCOPY;  Surgeon: Dominic Wu MD;  Location: [facility];  Service: Endoscopy;  Laterality: N/A;    TONSILLECTOMY      WISDOM TOOTH EXTRACTION         FMHX:  Family History  "  Problem Relation Age of Onset    Heart disease Mother     Blindness Maternal Grandmother     Cancer Maternal Grandfather         unknown    Cancer Paternal Grandfather         "all over"    Amblyopia Neg Hx     Cataracts Neg Hx     Glaucoma Neg Hx     Diabetes Neg Hx     Macular degeneration Neg Hx     Retinal detachment Neg Hx     Strabismus Neg Hx        SOCHX:  Social History     Tobacco Use    Smoking status: Never    Smokeless tobacco: Never   Substance Use Topics    Alcohol use: No     Comment: rarely       ALLERGIES:  Patient has no known allergies.    CURRENT MEDICATIONS:  Current Outpatient Medications on File Prior to Visit   Medication Sig Dispense Refill    fluorouraciL (EFUDEX) 5 % cream AAA scalp bid x 2 weeks, stop if severe irritation develops 40 g 1    triamcinolone acetonide 0.1% (KENALOG) 0.1 % cream AAA bid 60 g 1    UNABLE TO FIND CLEAN NAILS, SHAKE BOTTLE WELL, APPLY TWICE DAILY TO ALL AFFECTED NAILS USING APPLICATOR. (UP TO 2 MLS/DAY)      [DISCONTINUED] methimazole (TAPAZOLE) 5 MG Tab Take 5 mg by mouth once daily.       No current facility-administered medications on file prior to visit.       REVIEW OF SYSTEMS:  Review of Systems Complete; Negative, unless noted above.    GENERAL PHYSICAL EXAM:   Ht 5' 11" (1.803 m)   Wt 93.9 kg (207 lb 0.2 oz)   BMI 28.87 kg/m²    GEN: well developed, well nourished, no acute distress   PULM: No wheezing, no respiratory distress   CV: RRR    ORTHO EXAM:   Examination of the left index finger reveals minimal edema and mild ecchymosis.  No erythema, purulence, drainage, or any other signs of infection.  The wound does appear to be healing appropriately with appropriate eschar formation.  Able to flex extend the finger appropriately.  Strength not tested secondary to wound.  Normal sensation of the left index finger.  Capillary refill less than 2 seconds.    RADIOLOGY:   None.    ASSESSMENT:   Left index fingertip crush injury    PLAN:  1. I discussed " with Rob Hickman that he is progressing appropriately in the treatment course.  We discussed the best course of action this time is continue with his daily wound washing and dressing changes.  We did discuss if he was sedentary can leave the wound open to the air.  He verbalized understanding and verbally agreed with the treatment plan     2. He was left index fingertip was thoroughly cleaned in clinic today with chlorhexidine.  His left index fingertip was then dressed with 4 x 4 gauze and Coban wrap.    3. I would like him follow-up in clinic in 2 weeks for repeat evaluation.  He was instructed to contact clinic for any problems or concerns in the interim.

## 2024-04-30 DIAGNOSIS — Z00.00 ENCOUNTER FOR MEDICARE ANNUAL WELLNESS EXAM: ICD-10-CM

## 2024-06-18 ENCOUNTER — OFFICE VISIT (OUTPATIENT)
Dept: FAMILY MEDICINE | Facility: CLINIC | Age: 74
End: 2024-06-18
Payer: MEDICARE

## 2024-06-18 VITALS
HEIGHT: 71 IN | BODY MASS INDEX: 28.47 KG/M2 | RESPIRATION RATE: 18 BRPM | SYSTOLIC BLOOD PRESSURE: 126 MMHG | DIASTOLIC BLOOD PRESSURE: 80 MMHG | OXYGEN SATURATION: 97 % | WEIGHT: 203.38 LBS | TEMPERATURE: 98 F | HEART RATE: 57 BPM

## 2024-06-18 DIAGNOSIS — Z00.00 ENCOUNTER FOR MEDICARE ANNUAL WELLNESS EXAM: ICD-10-CM

## 2024-06-18 DIAGNOSIS — Z00.00 ENCOUNTER FOR PREVENTIVE HEALTH EXAMINATION: Primary | ICD-10-CM

## 2024-06-18 PROCEDURE — 1170F FXNL STATUS ASSESSED: CPT | Mod: CPTII,S$GLB,, | Performed by: NURSE PRACTITIONER

## 2024-06-18 PROCEDURE — 3074F SYST BP LT 130 MM HG: CPT | Mod: CPTII,S$GLB,, | Performed by: NURSE PRACTITIONER

## 2024-06-18 PROCEDURE — 1101F PT FALLS ASSESS-DOCD LE1/YR: CPT | Mod: CPTII,S$GLB,, | Performed by: NURSE PRACTITIONER

## 2024-06-18 PROCEDURE — 1124F ACP DISCUSS-NO DSCNMKR DOCD: CPT | Mod: CPTII,S$GLB,, | Performed by: NURSE PRACTITIONER

## 2024-06-18 PROCEDURE — 3288F FALL RISK ASSESSMENT DOCD: CPT | Mod: CPTII,S$GLB,, | Performed by: NURSE PRACTITIONER

## 2024-06-18 PROCEDURE — G0439 PPPS, SUBSEQ VISIT: HCPCS | Mod: S$GLB,,, | Performed by: NURSE PRACTITIONER

## 2024-06-18 PROCEDURE — 1159F MED LIST DOCD IN RCRD: CPT | Mod: CPTII,S$GLB,, | Performed by: NURSE PRACTITIONER

## 2024-06-18 PROCEDURE — 3079F DIAST BP 80-89 MM HG: CPT | Mod: CPTII,S$GLB,, | Performed by: NURSE PRACTITIONER

## 2024-06-18 PROCEDURE — 1160F RVW MEDS BY RX/DR IN RCRD: CPT | Mod: CPTII,S$GLB,, | Performed by: NURSE PRACTITIONER

## 2024-06-18 NOTE — PATIENT INSTRUCTIONS
Counseling and Referral of Other Preventative  (Italic type indicates deductible and co-insurance are waived)    Patient Name: Rob Hickman  Today's Date: 6/18/2024    Health Maintenance       Date Due Completion Date    Shingles Vaccine (1 of 2) Never done ---    RSV Vaccine (Age 60+ and Pregnant patients) (1 - 1-dose 60+ series) Never done ---    Pneumococcal Vaccines (Age 65+) (1 of 1 - PCV) Never done ---    TETANUS VACCINE 08/19/2026 8/19/2016    Lipid Panel 05/01/2028 5/1/2023    Colorectal Cancer Screening 08/24/2033 8/24/2023        No orders of the defined types were placed in this encounter.      The following information is provided to all patients.  This information is to help you find resources for any of the problems found today that may be affecting your health:                  Living healthy guide: www.[facility].[place].gov      Understanding Diabetes: www.diabetes.org      Eating healthy: www.cdc.gov/healthyweight      CDC home safety checklist: www.cdc.gov/steadi/patient.html      Agency on Aging: www.goea.[place].[facility]      Alcoholics anonymous (AA): www.aa.org      Physical Activity: www.dayana.nih.gov/nz4proj      Tobacco use: www.quitwithusla.org

## 2024-06-18 NOTE — PROGRESS NOTES
"  Rob Hickman presented for a follow-up Medicare AWV today. The following components were reviewed and updated:    Medical history  Family History  Social history  Allergies and Current Medications  Health Risk Assessment  Health Maintenance  Care Team    **See Completed Assessments for Annual Wellness visit with in the encounter summary    The following assessments were completed:  Depression Screening  Cognitive function Screening    Timed Get Up Test  Whisper Test    Opioid documentation:  Patient does not have a current opioid prescription.        Vitals:    06/18/24 0902   BP: 126/80   BP Location: Right arm   Patient Position: Sitting   BP Method: Large (Manual)   Pulse: (!) 57   Resp: 18   Temp: 97.5 °F (36.4 °C)   SpO2: 97%   Weight: 92.3 kg (203 lb 6 oz)   Height: 5' 11" (1.803 m)     Body mass index is 28.36 kg/m².       Physical Exam  Vitals reviewed.   Constitutional:       General: He is not in acute distress.  Pulmonary:      Effort: Pulmonary effort is normal. No respiratory distress.   Neurological:      Mental Status: He is alert and oriented to person, place, and time.   Psychiatric:         Mood and Affect: Mood normal.         Behavior: Behavior normal.         Thought Content: Thought content normal.         Judgment: Judgment normal.       Diagnoses and health risks identified today and associated recommendations/orders:  1. Encounter for preventive health examination  Reviewed and discussed health maintenance.    Declined update on vaccines  check labs next year    2. Encounter for Medicare annual wellness exam  - Ambulatory Referral/Consult to Enhanced Annual Wellness Visit (eAWV)    Provided Rob with a 5-10 year written screening schedule and personal prevention plan. Recommendations were developed using the USPSTF age appropriate recommendations. Education, counseling, and referrals were provided as needed.  After Visit Summary printed and given to patient which includes a list of " additional screenings\tests needed.    I offered to discuss advanced care planning, including how to pick a person who would make decisions for you if you were unable to make them for yourself, called a health care power of , and what kind of decisions you might make such as use of life sustaining treatments such as ventilators and tube feeding when faced with a life limiting illness recorded on a living will that they will need to know. (How you want to be cared for as you near the end of your natural life)  Patient declined a discussion regarding advance directives at this time. He will complete independently with his family   Hilary Franz NP

## 2024-10-30 ENCOUNTER — OFFICE VISIT (OUTPATIENT)
Dept: OPTOMETRY | Facility: CLINIC | Age: 74
End: 2024-10-30
Payer: MEDICARE

## 2024-10-30 DIAGNOSIS — H16.222 KERATITIS SICCA, LEFT EYE: ICD-10-CM

## 2024-10-30 DIAGNOSIS — H00.14 CHALAZION LEFT UPPER EYELID: Primary | ICD-10-CM

## 2024-10-30 PROCEDURE — 99203 OFFICE O/P NEW LOW 30 MIN: CPT | Mod: S$GLB,,,

## 2024-10-30 PROCEDURE — 1101F PT FALLS ASSESS-DOCD LE1/YR: CPT | Mod: CPTII,S$GLB,,

## 2024-10-30 PROCEDURE — 3288F FALL RISK ASSESSMENT DOCD: CPT | Mod: CPTII,S$GLB,,

## 2024-10-30 PROCEDURE — 1159F MED LIST DOCD IN RCRD: CPT | Mod: CPTII,S$GLB,,

## 2024-10-30 PROCEDURE — 99999 PR PBB SHADOW E&M-EST. PATIENT-LVL II: CPT | Mod: PBBFAC,,,

## 2024-10-30 PROCEDURE — 1125F AMNT PAIN NOTED PAIN PRSNT: CPT | Mod: CPTII,S$GLB,,

## 2024-10-30 RX ORDER — TOBRAMYCIN AND DEXAMETHASONE 3; 1 MG/ML; MG/ML
1 SUSPENSION/ DROPS OPHTHALMIC 4 TIMES DAILY
Qty: 2.5 ML | Refills: 0 | Status: SHIPPED | OUTPATIENT
Start: 2024-10-30 | End: 2024-11-04

## 2025-01-30 DIAGNOSIS — Z00.00 ENCOUNTER FOR MEDICARE ANNUAL WELLNESS EXAM: ICD-10-CM

## 2025-07-22 ENCOUNTER — OFFICE VISIT (OUTPATIENT)
Dept: FAMILY MEDICINE | Facility: CLINIC | Age: 75
End: 2025-07-22
Payer: MEDICARE

## 2025-07-22 VITALS
HEART RATE: 73 BPM | OXYGEN SATURATION: 96 % | DIASTOLIC BLOOD PRESSURE: 72 MMHG | WEIGHT: 205.81 LBS | HEIGHT: 71 IN | SYSTOLIC BLOOD PRESSURE: 112 MMHG | BODY MASS INDEX: 28.81 KG/M2

## 2025-07-22 DIAGNOSIS — L30.1 DYSHIDROTIC ECZEMA: Primary | ICD-10-CM

## 2025-07-22 PROCEDURE — 3288F FALL RISK ASSESSMENT DOCD: CPT | Mod: CPTII,S$GLB,, | Performed by: NURSE PRACTITIONER

## 2025-07-22 PROCEDURE — 3008F BODY MASS INDEX DOCD: CPT | Mod: CPTII,S$GLB,, | Performed by: NURSE PRACTITIONER

## 2025-07-22 PROCEDURE — 1159F MED LIST DOCD IN RCRD: CPT | Mod: CPTII,S$GLB,, | Performed by: NURSE PRACTITIONER

## 2025-07-22 PROCEDURE — 99213 OFFICE O/P EST LOW 20 MIN: CPT | Mod: S$GLB,,, | Performed by: NURSE PRACTITIONER

## 2025-07-22 PROCEDURE — 3074F SYST BP LT 130 MM HG: CPT | Mod: CPTII,S$GLB,, | Performed by: NURSE PRACTITIONER

## 2025-07-22 PROCEDURE — 1160F RVW MEDS BY RX/DR IN RCRD: CPT | Mod: CPTII,S$GLB,, | Performed by: NURSE PRACTITIONER

## 2025-07-22 PROCEDURE — 1101F PT FALLS ASSESS-DOCD LE1/YR: CPT | Mod: CPTII,S$GLB,, | Performed by: NURSE PRACTITIONER

## 2025-07-22 PROCEDURE — 3078F DIAST BP <80 MM HG: CPT | Mod: CPTII,S$GLB,, | Performed by: NURSE PRACTITIONER

## 2025-07-22 PROCEDURE — 99999 PR PBB SHADOW E&M-EST. PATIENT-LVL III: CPT | Mod: PBBFAC,,, | Performed by: NURSE PRACTITIONER

## 2025-07-22 RX ORDER — TRIAMCINOLONE ACETONIDE 1 MG/G
OINTMENT TOPICAL 2 TIMES DAILY
Qty: 30 G | Refills: 0 | Status: SHIPPED | OUTPATIENT
Start: 2025-07-22 | End: 2025-08-05

## 2025-07-22 NOTE — PROGRESS NOTES
THIS DOCUMENT WAS MADE IN PART WITH VOICE RECOGNITION SOFTWARE.  OCCASIONALLY THIS SOFTWARE WILL MISINTERPRET WORDS OR PHRASES.     Assessment and Plan:    Dyshidrotic eczema  -     triamcinolone acetonide 0.1% (KENALOG) 0.1 % ointment; Apply topically 2 (two) times daily. for 14 days  Dispense: 30 g; Refill: 0             Visit summary:  Patient was seen in clinic today for rash.    Symptoms presentation consistent with dyshidrotic eczema    Rx:  Triamcinolone ointment     Advised on diagnosis, medications, skin care and symptomatic treatment.    Keep skin clean and dry as much as possible.  Avoid scratching  When  washing use lukewarm water and a mild hypoallergenic cleanser.  avoid washing hands with too hot of water.  Keep hands well moisturized with hypoallergenic hand cream, such as CeraVe or Aquaphor.  Use prescribed topical steroids only on affected areas  Do not let household or industrial  come into contact with your skin     Consider referral to Dermatology if symptoms persist.      Follow up in about 2 weeks (around 8/5/2025), or if symptoms worsen or fail to improve.   ______________________________________________________________________  Subjective:    History of Present Illness    CHIEF COMPLAINT:  - Patient presents with a persistent rash on his right middle finger that has been present for approximately three weeks.    HPI:  Patient reports developing what he initially thought were two small bites on his right middle finger approximately 3 weeks ago. The initial symptoms were mild itching that lasted for a few days. Subsequently, he noticed his knuckles started peeling, and the affected area began to resemble eczema. The rash progressed to include a few small blisters on one side of the finger, followed by another blister appearing further up the finger. He describes the affected area as dry and scaly, with an appearance similar to eczema. The rash is primarily localized to one finger,  "with some involvement of the knuckle area, and is mildly itchy. The condition has persisted. Patient regularly uses lotion on his hands and wears gloves when working in the yard or on his car to protect his hands. He denies using any strong detergents or putting his hands in chemicals. He has not had this type of rash before. Patient denies having the rash anywhere else on his body besides the affected finger and knuckle area.          Medications:  Medications Ordered Prior to Encounter[1]    Review of Systems:  Review of Systems   Constitutional:  Negative for fever.   Musculoskeletal:  Negative for arthralgias and joint swelling.   Skin:  Positive for rash.   Allergic/Immunologic: Negative for environmental allergies and food allergies.       Past Medical History:  Past Medical History:   Diagnosis Date    Basal cell carcinoma     L arm approx 2010    Bilateral sensorineural hearing loss 5/4/2023    Graves disease     previously followed w/ Endo; + Ab and high uptake on scan; remission after Tx with tapazole    Nuclear sclerosis, bilateral 03/11/2021    Nuclear sclerotic cataract of right eye 04/19/2021    Onychocryptosis 06/16/2020    Onychomycosis due to dermatophyte 06/16/2020    Pyogenic granuloma of skin 06/16/2020    Thyrotoxicosis without mention of goiter or other cause, without mention of thyrotoxic crisis or storm 10/23/2011    Dx updated per 2019 IMO Load    Tinnitus of both ears 5/4/2023       Objective:    Vitals:  Vitals:    07/22/25 1451   BP: 112/72   Pulse: 73   SpO2: 96%   Weight: 93.3 kg (205 lb 12.8 oz)   Height: 5' 11" (1.803 m)       Physical Exam  Vitals and nursing note reviewed.   Constitutional:       General: He is not in acute distress.  HENT:      Head: Normocephalic and atraumatic.   Eyes:      General: No scleral icterus.     Conjunctiva/sclera: Conjunctivae normal.   Cardiovascular:      Rate and Rhythm: Normal rate and regular rhythm.   Pulmonary:      Effort: Pulmonary effort is " normal. No respiratory distress.      Breath sounds: Normal breath sounds.   Musculoskeletal:      Right lower leg: No edema.      Left lower leg: No edema.   Skin:     General: Skin is warm and dry.      Findings: Rash (erythematous papular clusters with surrounding skin flaking noted to right 3rd digit-see media) present. Rash is papular and purpuric.   Neurological:      Mental Status: He is alert and oriented to person, place, and time.   Psychiatric:         Mood and Affect: Mood normal.         Behavior: Behavior normal.         Thought Content: Thought content normal.         Data:  .            Medical history reviewed, Medications reconciled.          ROB AcostaP-C  Family Medicine         [1]   Current Outpatient Medications on File Prior to Visit   Medication Sig Dispense Refill    fluorouraciL (EFUDEX) 5 % cream AAA scalp bid x 2 weeks, stop if severe irritation develops 40 g 1    triamcinolone acetonide 0.1% (KENALOG) 0.1 % cream AAA bid 60 g 1    [DISCONTINUED] methimazole (TAPAZOLE) 5 MG Tab Take 5 mg by mouth once daily.       No current facility-administered medications on file prior to visit.

## 2025-07-22 NOTE — PATIENT INSTRUCTIONS
Keep skin clean and dry as much as possible.  Avoid scratching  When  washing use lukewarm water and a mild hypoallergenic cleanser.  avoid washing hands with too hot of water  Keep hands well moisturized with hypoallergenic hand cream, such as CeraVe or Aquaphor.  Use prescribed topical steroids only on affected areas  Do not let household or industrial  come into contact with your skin        Do not hesitate to get in touch with me should you have any further questions.      Thank you for trusting me with your care!  I wish you health and happiness.     DEVORAH Acosta

## (undated) DEVICE — SYR SLIP TIP 1CC

## (undated) DEVICE — Device

## (undated) DEVICE — CASSETTE INFINITI

## (undated) DEVICE — SOL BETADINE 5%

## (undated) DEVICE — GLOVE BIOGEL SKINSENSE PI 7.5